# Patient Record
Sex: MALE | Race: WHITE | Employment: FULL TIME | URBAN - METROPOLITAN AREA
[De-identification: names, ages, dates, MRNs, and addresses within clinical notes are randomized per-mention and may not be internally consistent; named-entity substitution may affect disease eponyms.]

---

## 2017-08-25 ENCOUNTER — OFFICE VISIT (OUTPATIENT)
Dept: SURGERY | Age: 27
End: 2017-08-25

## 2017-08-25 VITALS
DIASTOLIC BLOOD PRESSURE: 76 MMHG | WEIGHT: 133.6 LBS | HEART RATE: 64 BPM | RESPIRATION RATE: 20 BRPM | TEMPERATURE: 98.4 F | SYSTOLIC BLOOD PRESSURE: 114 MMHG | BODY MASS INDEX: 21.47 KG/M2 | HEIGHT: 66 IN | OXYGEN SATURATION: 100 %

## 2017-08-25 DIAGNOSIS — L05.91 PILONIDAL CYST WITHOUT MENTION OF ABSCESS: Primary | ICD-10-CM

## 2017-08-25 DIAGNOSIS — L02.91 ABSCESS: ICD-10-CM

## 2017-08-25 NOTE — MR AVS SNAPSHOT
Visit Information Date & Time Provider Department Dept. Phone Encounter #  
 8/25/2017 10:10 AM Keri Rosario MD [facility] [place] [address] [id number] Your Appointments 10/9/2017  2:00 PM  
POST OP 10 MIN with Keri Rosario MD  
[facility] Dr. Eleazar Burciaga ([facility]) Appt Note: Post/op excision of Pilonidal cyst with advancement flap on 9/25/17  
 [address] [place] [address], [address] [facility], [address] [phone]  
[address] [facility], [address] [facility], [address] [facility] [address] [phone] Upcoming Health Maintenance Date Due DTaP/Tdap/Td series (1 - Tdap) 7/25/2012 INFLUENZA AGE 9 TO ADULT 8/1/2017 Allergies as of 8/25/2017  Review Complete On: 8/25/2017 By: Keri Rosario MD  
 No Known Allergies Current Immunizations  Never Reviewed No immunizations on file. Not reviewed this visit You Were Diagnosed With   
  
 Codes Comments Abscess    -  Primary ICD-10-CM: L02.91 
ICD-9-CM: 908. 9 Vitals BP Pulse Temp Resp Height(growth percentile) Weight(growth percentile) 114/76 (BP 1 Location: Left arm, BP Patient Position: Sitting) 64 98.4 °F (36.9 °C) (Oral) 20 5' 6\" (1.676 m) 133 lb 9.6 oz (60.6 kg) SpO2 BMI Smoking Status 100% 21.56 kg/m2 Never Smoker BMI and BSA Data Body Mass Index Body Surface Area  
 21.56 kg/m 2 1.68 m 2 Preferred Pharmacy Pharmacy Name Phone [facility] [address] [place] [facility] - [address] , [place] [address] [phone] Your Updated Medication List  
  
Notice  As of 8/25/2017 11:32 AM  
 You have not been prescribed any medications. We Performed the Following AEROBIC BACTERIAL CULTURE [id number] CPT(R)] Introducing [facility]! Ravin Layne introduces Leadjinit patient portal. Now you can access parts of your medical record, email your doctor's office, and request medication refills online. 1. In your internet browser, go to [email or web address] 2. Click on the First Time User? Click Here link in the Sign In box. You will see the New Member Sign Up page. 3. Enter your Car[employer] Nut Access Code exactly as it appears below. You will not need to use this code after youve completed the sign-up process. If you do not sign up before the expiration date, you must request a new code. · [employer] Access Code: [email or web address] Expires: 11/23/2017 10:35 AM 
 
4. Enter the last four digits of your Social Security Number (xxxx) and Date of Birth (mm/dd/yyyy) as indicated and click Submit. You will be taken to the next sign-up page. 5. Create a Ugeniet ID. This will be your Caro Nut login ID and cannot be changed, so think of one that is secure and easy to remember. 6. Create a Car[employer] Nut password. You can change your password at any time. 7. Enter your Password Reset Question and Answer. This can be used at a later time if you forget your password. 8. Enter your e-mail address. You will receive e-mail notification when new information is available in [address] 9. Click Sign Up. You can now view and download portions of your medical record. 10. Click the Download Summary menu link to download a portable copy of your medical information. If you have questions, please visit the Frequently Asked Questions section of the Car[employer] website. Remember, [employer] is NOT to be used for urgent needs. For medical emergencies, dial 911. Now available from your iPhone and Android! Please provide this summary of care documentation to your next provider. Your primary care clinician is listed as Henry Bucio. If you have any questions after today's visit, please call [phone].

## 2017-08-25 NOTE — PROGRESS NOTES
Saw one year ago  This is his first time back    Continue to get flairup every couple months  Drains and gets better  BMs normal, no pain with defication. No blood    Exam: several sinus tracts, several containing hair, along the midline and to the side does not appear to be extending to the rectum. Slight drainage, culture sent    Now ready to have it fixed because he is going off his parents insurance soon. F/u culture  Did not follow instructions to keep hair trimmed. Stressed the importance to do so now and after the surgery to prevent recurrence. I had an extensive discussion with Liat Potts regarding the risks, benefits, and alternatives of proceeding with an Excision of pilonidal cyst and closure with advancement flap. Risks of surgery including the risk of anesthesia, bleeding, infection, injury to surrounding structure, recurrence, poor healing and prolonged wound care, and the lack of symptomatic improvement were discussed and he is in agreement to proceed. Expressed understanding of the surgery and expected recovery. I had an extensive and thorough discussion with Liat Potts regarding current diagnosis and treatment recommendations. Total face-to-face time spent with him was 25 minutes with the majority spent with counseling and coordination of care.

## 2017-08-25 NOTE — LETTER
8/25/2017 11:27 AM 
 
Mr. Charisma Petty [facility] [address] Surgery Instruction Sheet You have been scheduled for surgery on 9/25/17 at 9:00am at [facility]. Please report to the Surgery Center at 8:00am, this is approximately 1 hours prior to your surgery time. The Surgery Center is located on the [address] [place] [address] side of the [facility], Building [address]. You may or may not need to have a Pre-op Visit prior to your surgery. The Surgery Center nurse will call you directly and set this appointment with you. Bring a list of medications and your insurance cards with you. You may eat/drink prior to this visit. The Pre-op nurse will review your medical history, medications and give you additional instructions. They will also confirm your arrival time Call your physician immediately if you notice a change in your health between the time you saw your physician and the day of surgery If you take a blood thinner, please let us know. Call your ordering Doctor to make sure you can stop taking it prior to your surgery. STOP YOUR  ASPIRIN 5 DAYS PRIOR TO SURGERY. DO NOT TAKE IBUPROFEN, ADVIL, MOTRIN, ALEVE, EXCEDRIN, BC POWDER, GOODIES, FISH OIL OR ANY MEDICATION CONTAINING ASPIRIN 5 DAYS PRIOR TO YOUR SURGERY. MAY TAKE TYLENOL Eat a light dinner the evening before your surgery. DO NOT EAT OR DRINK ANYTHING AFTER MIDNIGHT THE NIGHT BEFORE YOUR SURGERY. This includes water, chewing gum, lifesavers, etc.  The Pre op nurse will check with you about any medication that you may need to take the morning of surgery. Shower with a new bar of Dial, Safeguard (antibacterial) soap or solution given to you by Preop, the night before surgery. Do not use lotion, powder or deodorant on the skin after showering.   Wear loose, comfortable clothing the day of surgery and bring a container to store your contacts, eyeglasses, dentures, hearing aid, etc.  Do not bring money, valuables, jewelry, etc. to the hospital.   
 
If you are having outpatient surgery, someone must come with you the morning of surgery to drive you home. You can not drive for 24 hours after any anesthesia. Sometimes it is necessary to stay overnight and leave the next morning. This is still considered outpatient for insurance deductibles. Someone will still need to drive you home. If you have questions or concerns, please feel free to call /Stephen at [phone]. If you need to cancel your surgery, please call as soon as possible. Sincerely, Mayela Luke MD

## 2017-09-05 ENCOUNTER — TELEPHONE (OUTPATIENT)
Dept: SURGERY | Age: 27
End: 2017-09-05

## 2017-09-05 RX ORDER — SULFAMETHOXAZOLE AND TRIMETHOPRIM 800; 160 MG/1; MG/1
1 TABLET ORAL 2 TIMES DAILY
Qty: 20 TAB | Refills: 0 | Status: ON HOLD | OUTPATIENT
Start: 2017-09-20 | End: 2017-09-25

## 2017-09-05 NOTE — TELEPHONE ENCOUNTER
Aerobic culture abnormal from Pilonidal cyst drainage. Make sure to trim the hairs with miller's clippers Use gauze when it is draining  . Start Bactrim DS BID 5 days prior to surgery, called to Pharm[facility] for questions with clarification

## 2017-09-05 NOTE — TELEPHONE ENCOUNTER
Reviewed cultures:  Aerobic Culture (Abnormal)   Escherichia coli   Comment      Aerobic Culture (Abnormal)   Beta hemolytic Streptococcus, group B   Comment        Have him:    1.  make sure he is trimming the hairs with miller's clippers  2. Use gauze when it is draining  3. Start Bactrim DS BID 5 days prior to surgery.

## 2017-09-19 NOTE — PERIOP NOTES
[facility]  Ambulatory [place] Unit  Pre-operative Instructions    Surgery/Procedure Date  9/25/17            Tentative Arrival Time 0745      1. On the day of your surgery/procedure, please report to the Ambulatory Surgery Unit Registration Desk and sign in at your designated time. The Ambulatory Surgery Unit is located in [facility] on the [facility] side of the [facility] across from the [facility]. Please have all of your health insurance cards and a photo ID. 2. You must have someone with you to drive you home, as you should not drive a car for 24 hours following anesthesia. Please make arrangements for a responsible adult friend or family member to stay with you for at least the first 24 hours after your surgery. 3. Do not have anything to eat or drink (including water, gum, mints, coffee, juice) after midnight   9/24/17. This may not apply to medications prescribed by your physician. (Please note below the special instructions with medications to take the morning of surgery, if applicable.)    4. We recommend you do not drink any alcoholic beverages for 24 hours before and after your surgery. 5. Contact your surgeons office for instructions on the following medications: non-steroidal anti-inflammatory drugs (i.e. Advil, Aleve), vitamins, and supplements. (Some surgeons will want you to stop these medications prior to surgery and others may allow you to take them)   **If you are currently taking Plavix, Coumadin, Aspirin and/or other blood-thinning agents, contact your surgeon for instructions. ** Your surgeon will partner with the physician prescribing these medications to determine if it is safe to stop or if you need to continue taking. Please do not stop taking these medications without instructions from your surgeon.     6. In an effort to help prevent surgical site infection, we ask that you shower with an anti-bacterial soap (i.e. Dial or Safeguard) for 3 days prior to and on the morning of surgery, using a fresh towel after each shower. (Please begin this process with fresh bed linens.) Do not apply any lotions, powders, or deodorants after the shower on the day of your procedure. If applicable, please do not shave the operative site for 48 hours prior to surgery. 7. Wear comfortable clothes. Wear glasses instead of contacts. Do not bring any jewelry or money (other than copays or fees as instructed). Do not wear make-up, particularly mascara, the morning of your surgery. Do not wear nail polish, particularly if you are having foot /hand surgery. Wear your hair loose or down, no ponytails, buns, rosemary pins or clips. All body piercings must be removed. 8. You should understand that if you do not follow these instructions your surgery may be cancelled. If your physical condition changes (i.e. fever, cold or flu) please contact your surgeon as soon as possible. 9. It is important that you be on time. If a situation occurs where you may be late, or if you have any questions or problems, please call [phone].    10. Your surgery time may be subject to change. You will receive a phone call the day prior to surgery to confirm your arrival time. 11. Pediatric patients: please bring a change of clothes, diapers, bottle/sippy cup, pacifier, etc.      Special Instructions: Take all medications and inhalers, as prescribed, on the morning of surgery with a sip of water EXCEPT: none      I understand a pre-operative phone call will be made to verify my surgery time. In the event that I am not available, I give permission for a message to be left on my answering service and/or with another person?       Yes     (instructions given verbally during phone assessment- pt voiced understanding)     ___________________      ___________________      ________________  (Signature of Patient)          (Witness)                   (Date and Time)

## 2017-09-20 ENCOUNTER — TELEPHONE (OUTPATIENT)
Dept: SURGERY | Age: 27
End: 2017-09-20

## 2017-09-20 NOTE — TELEPHONE ENCOUNTER
Need to arrive @ [facility] @6:30am for 7:30am surgery start. Continue to follow all pr-op instructions. All questions answered with clarification.

## 2017-09-22 ENCOUNTER — ANESTHESIA EVENT (OUTPATIENT)
Dept: SURGERY | Age: 27
End: 2017-09-22
Payer: COMMERCIAL

## 2017-09-22 LAB
BACTERIA SPEC AEROBE CULT: ABNORMAL

## 2017-09-25 ENCOUNTER — ANESTHESIA (OUTPATIENT)
Dept: SURGERY | Age: 27
End: 2017-09-25
Payer: COMMERCIAL

## 2017-09-25 ENCOUNTER — HOSPITAL ENCOUNTER (OUTPATIENT)
Age: 27
Setting detail: OUTPATIENT SURGERY
Discharge: HOME | End: 2017-09-25
Attending: SURGERY | Admitting: SURGERY
Payer: COMMERCIAL

## 2017-09-25 VITALS
SYSTOLIC BLOOD PRESSURE: 119 MMHG | HEIGHT: 67 IN | BODY MASS INDEX: 20.4 KG/M2 | OXYGEN SATURATION: 100 % | HEART RATE: 60 BPM | WEIGHT: 130 LBS | DIASTOLIC BLOOD PRESSURE: 76 MMHG | TEMPERATURE: 97.4 F | RESPIRATION RATE: 12 BRPM

## 2017-09-25 PROCEDURE — 77030033138 HC SUT PGA STRATFX J&J -B: Performed by: SURGERY

## 2017-09-25 PROCEDURE — 76210000040 HC AMBSU PH I REC FIRST 0.5 HR: Performed by: SURGERY

## 2017-09-25 PROCEDURE — 77030011640 HC PAD GRND REM COVD -A: Performed by: SURGERY

## 2017-09-25 PROCEDURE — 77030008684 HC TU ET CUF COVD -B: Performed by: NURSE ANESTHETIST, CERTIFIED REGISTERED

## 2017-09-25 PROCEDURE — 77030011265 HC ELECTRD BLD HEX COVD -A: Performed by: SURGERY

## 2017-09-25 PROCEDURE — 76210000057 HC AMBSU PH II REC 1 TO 1.5 HR: Performed by: SURGERY

## 2017-09-25 PROCEDURE — 77030003028 HC SUT VCRL J&J -A: Performed by: SURGERY

## 2017-09-25 PROCEDURE — 74011250636 HC RX REV CODE- 250/636: Performed by: SURGERY

## 2017-09-25 PROCEDURE — 74011250636 HC RX REV CODE- 250/636: Performed by: ANESTHESIOLOGY

## 2017-09-25 PROCEDURE — 76060000062 HC AMB SURG ANES 1 TO 1.5 HR: Performed by: SURGERY

## 2017-09-25 PROCEDURE — 77030002933 HC SUT MCRYL J&J -A: Performed by: SURGERY

## 2017-09-25 PROCEDURE — 74011000250 HC RX REV CODE- 250

## 2017-09-25 PROCEDURE — 77030026438 HC STYL ET INTUB CARD -A: Performed by: NURSE ANESTHETIST, CERTIFIED REGISTERED

## 2017-09-25 PROCEDURE — 76030000001 HC AMB SURG OR TIME 1 TO 1.5: Performed by: SURGERY

## 2017-09-25 PROCEDURE — 77030018836 HC SOL IRR NACL ICUM -A: Performed by: SURGERY

## 2017-09-25 PROCEDURE — 74011250636 HC RX REV CODE- 250/636

## 2017-09-25 PROCEDURE — 74011000250 HC RX REV CODE- 250: Performed by: SURGERY

## 2017-09-25 PROCEDURE — 77030013567 HC DRN WND RESERV BARD -A: Performed by: SURGERY

## 2017-09-25 PROCEDURE — 77030020255 HC SOL INJ LR 1000ML BG: Performed by: SURGERY

## 2017-09-25 PROCEDURE — 88304 TISSUE EXAM BY PATHOLOGIST: CPT | Performed by: SURGERY

## 2017-09-25 PROCEDURE — 77030013079 HC BLNKT BAIR HGGR 3M -A: Performed by: NURSE ANESTHETIST, CERTIFIED REGISTERED

## 2017-09-25 PROCEDURE — 77030031139 HC SUT VCRL2 J&J -A: Performed by: SURGERY

## 2017-09-25 PROCEDURE — 77030012406 HC DRN WND PENRS BARD -A: Performed by: SURGERY

## 2017-09-25 RX ORDER — LIDOCAINE HYDROCHLORIDE 20 MG/ML
INJECTION, SOLUTION EPIDURAL; INFILTRATION; INTRACAUDAL; PERINEURAL AS NEEDED
Status: DISCONTINUED | OUTPATIENT
Start: 2017-09-25 | End: 2017-09-25 | Stop reason: HOSPADM

## 2017-09-25 RX ORDER — DIPHENHYDRAMINE HYDROCHLORIDE 50 MG/ML
12.5 INJECTION, SOLUTION INTRAMUSCULAR; INTRAVENOUS AS NEEDED
Status: DISCONTINUED | OUTPATIENT
Start: 2017-09-25 | End: 2017-09-25 | Stop reason: HOSPADM

## 2017-09-25 RX ORDER — SODIUM CHLORIDE 0.9 % (FLUSH) 0.9 %
5-10 SYRINGE (ML) INJECTION AS NEEDED
Status: DISCONTINUED | OUTPATIENT
Start: 2017-09-25 | End: 2017-09-25 | Stop reason: HOSPADM

## 2017-09-25 RX ORDER — BUPIVACAINE HYDROCHLORIDE AND EPINEPHRINE 5; 5 MG/ML; UG/ML
INJECTION, SOLUTION EPIDURAL; INTRACAUDAL; PERINEURAL AS NEEDED
Status: DISCONTINUED | OUTPATIENT
Start: 2017-09-25 | End: 2017-09-25 | Stop reason: HOSPADM

## 2017-09-25 RX ORDER — ROCURONIUM BROMIDE 10 MG/ML
INJECTION, SOLUTION INTRAVENOUS AS NEEDED
Status: DISCONTINUED | OUTPATIENT
Start: 2017-09-25 | End: 2017-09-25 | Stop reason: HOSPADM

## 2017-09-25 RX ORDER — DEXAMETHASONE SODIUM PHOSPHATE 4 MG/ML
INJECTION, SOLUTION INTRA-ARTICULAR; INTRALESIONAL; INTRAMUSCULAR; INTRAVENOUS; SOFT TISSUE AS NEEDED
Status: DISCONTINUED | OUTPATIENT
Start: 2017-09-25 | End: 2017-09-25 | Stop reason: HOSPADM

## 2017-09-25 RX ORDER — KETOROLAC TROMETHAMINE 30 MG/ML
INJECTION, SOLUTION INTRAMUSCULAR; INTRAVENOUS AS NEEDED
Status: DISCONTINUED | OUTPATIENT
Start: 2017-09-25 | End: 2017-09-25 | Stop reason: HOSPADM

## 2017-09-25 RX ORDER — SULFAMETHOXAZOLE AND TRIMETHOPRIM 800; 160 MG/1; MG/1
1 TABLET ORAL 2 TIMES DAILY
Qty: 20 TAB | Refills: 0 | Status: SHIPPED | OUTPATIENT
Start: 2017-09-25 | End: 2017-10-05

## 2017-09-25 RX ORDER — GLYCOPYRROLATE 0.2 MG/ML
INJECTION INTRAMUSCULAR; INTRAVENOUS AS NEEDED
Status: DISCONTINUED | OUTPATIENT
Start: 2017-09-25 | End: 2017-09-25 | Stop reason: HOSPADM

## 2017-09-25 RX ORDER — IBUPROFEN 600 MG/1
600 TABLET ORAL
Qty: 40 TAB | Refills: 0 | Status: SHIPPED | OUTPATIENT
Start: 2017-09-25 | End: 2017-10-09

## 2017-09-25 RX ORDER — MORPHINE SULFATE 10 MG/ML
2 INJECTION, SOLUTION INTRAMUSCULAR; INTRAVENOUS
Status: DISCONTINUED | OUTPATIENT
Start: 2017-09-25 | End: 2017-09-25 | Stop reason: HOSPADM

## 2017-09-25 RX ORDER — FENTANYL CITRATE 50 UG/ML
25 INJECTION, SOLUTION INTRAMUSCULAR; INTRAVENOUS
Status: DISCONTINUED | OUTPATIENT
Start: 2017-09-25 | End: 2017-09-25 | Stop reason: HOSPADM

## 2017-09-25 RX ORDER — CEFAZOLIN SODIUM IN 0.9 % NACL 2 G/100 ML
2 PLASTIC BAG, INJECTION (ML) INTRAVENOUS ONCE
Status: DISCONTINUED | OUTPATIENT
Start: 2017-09-25 | End: 2017-09-25 | Stop reason: HOSPADM

## 2017-09-25 RX ORDER — LIDOCAINE HYDROCHLORIDE 10 MG/ML
0.1 INJECTION, SOLUTION EPIDURAL; INFILTRATION; INTRACAUDAL; PERINEURAL AS NEEDED
Status: DISCONTINUED | OUTPATIENT
Start: 2017-09-25 | End: 2017-09-25 | Stop reason: HOSPADM

## 2017-09-25 RX ORDER — FENTANYL CITRATE 50 UG/ML
INJECTION, SOLUTION INTRAMUSCULAR; INTRAVENOUS AS NEEDED
Status: DISCONTINUED | OUTPATIENT
Start: 2017-09-25 | End: 2017-09-25 | Stop reason: HOSPADM

## 2017-09-25 RX ORDER — OXYCODONE AND ACETAMINOPHEN 5; 325 MG/1; MG/1
1 TABLET ORAL
Qty: 40 TAB | Refills: 0 | Status: SHIPPED | OUTPATIENT
Start: 2017-09-25 | End: 2017-10-09

## 2017-09-25 RX ORDER — HYDROMORPHONE HYDROCHLORIDE 1 MG/ML
.2-.5 INJECTION, SOLUTION INTRAMUSCULAR; INTRAVENOUS; SUBCUTANEOUS ONCE
Status: DISCONTINUED | OUTPATIENT
Start: 2017-09-25 | End: 2017-09-25 | Stop reason: HOSPADM

## 2017-09-25 RX ORDER — SODIUM CHLORIDE, SODIUM LACTATE, POTASSIUM CHLORIDE, CALCIUM CHLORIDE 600; 310; 30; 20 MG/100ML; MG/100ML; MG/100ML; MG/100ML
25 INJECTION, SOLUTION INTRAVENOUS CONTINUOUS
Status: DISCONTINUED | OUTPATIENT
Start: 2017-09-25 | End: 2017-09-25 | Stop reason: HOSPADM

## 2017-09-25 RX ORDER — NEOSTIGMINE METHYLSULFATE 1 MG/ML
INJECTION INTRAVENOUS AS NEEDED
Status: DISCONTINUED | OUTPATIENT
Start: 2017-09-25 | End: 2017-09-25 | Stop reason: HOSPADM

## 2017-09-25 RX ORDER — ONDANSETRON 2 MG/ML
INJECTION INTRAMUSCULAR; INTRAVENOUS AS NEEDED
Status: DISCONTINUED | OUTPATIENT
Start: 2017-09-25 | End: 2017-09-25 | Stop reason: HOSPADM

## 2017-09-25 RX ORDER — CEFAZOLIN SODIUM IN 0.9 % NACL 2 G/100 ML
PLASTIC BAG, INJECTION (ML) INTRAVENOUS
Status: DISCONTINUED
Start: 2017-09-25 | End: 2017-09-25 | Stop reason: HOSPADM

## 2017-09-25 RX ORDER — OXYCODONE AND ACETAMINOPHEN 5; 325 MG/1; MG/1
1 TABLET ORAL ONCE
Status: DISCONTINUED | OUTPATIENT
Start: 2017-09-25 | End: 2017-09-25 | Stop reason: HOSPADM

## 2017-09-25 RX ORDER — MIDAZOLAM HYDROCHLORIDE 1 MG/ML
INJECTION, SOLUTION INTRAMUSCULAR; INTRAVENOUS AS NEEDED
Status: DISCONTINUED | OUTPATIENT
Start: 2017-09-25 | End: 2017-09-25 | Stop reason: HOSPADM

## 2017-09-25 RX ORDER — SODIUM CHLORIDE 0.9 % (FLUSH) 0.9 %
5-10 SYRINGE (ML) INJECTION EVERY 8 HOURS
Status: DISCONTINUED | OUTPATIENT
Start: 2017-09-25 | End: 2017-09-25 | Stop reason: HOSPADM

## 2017-09-25 RX ORDER — PROPOFOL 10 MG/ML
INJECTION, EMULSION INTRAVENOUS AS NEEDED
Status: DISCONTINUED | OUTPATIENT
Start: 2017-09-25 | End: 2017-09-25 | Stop reason: HOSPADM

## 2017-09-25 RX ADMIN — FENTANYL CITRATE 25 MCG: 50 INJECTION, SOLUTION INTRAMUSCULAR; INTRAVENOUS at 07:45

## 2017-09-25 RX ADMIN — KETOROLAC TROMETHAMINE 30 MG: 30 INJECTION, SOLUTION INTRAMUSCULAR; INTRAVENOUS at 08:56

## 2017-09-25 RX ADMIN — ONDANSETRON 4 MG: 2 INJECTION INTRAMUSCULAR; INTRAVENOUS at 07:53

## 2017-09-25 RX ADMIN — FENTANYL CITRATE 50 MCG: 50 INJECTION, SOLUTION INTRAMUSCULAR; INTRAVENOUS at 07:39

## 2017-09-25 RX ADMIN — DEXAMETHASONE SODIUM PHOSPHATE 8 MG: 4 INJECTION, SOLUTION INTRA-ARTICULAR; INTRALESIONAL; INTRAMUSCULAR; INTRAVENOUS; SOFT TISSUE at 07:53

## 2017-09-25 RX ADMIN — NEOSTIGMINE METHYLSULFATE 2 MG: 1 INJECTION INTRAVENOUS at 08:43

## 2017-09-25 RX ADMIN — GLYCOPYRROLATE 0.4 MG: 0.2 INJECTION INTRAMUSCULAR; INTRAVENOUS at 08:43

## 2017-09-25 RX ADMIN — ROCURONIUM BROMIDE 5 MG: 10 INJECTION, SOLUTION INTRAVENOUS at 07:39

## 2017-09-25 RX ADMIN — SODIUM CHLORIDE, SODIUM LACTATE, POTASSIUM CHLORIDE, AND CALCIUM CHLORIDE: 600; 310; 30; 20 INJECTION, SOLUTION INTRAVENOUS at 08:42

## 2017-09-25 RX ADMIN — ROCURONIUM BROMIDE 25 MG: 10 INJECTION, SOLUTION INTRAVENOUS at 07:40

## 2017-09-25 RX ADMIN — PROPOFOL 130 MG: 10 INJECTION, EMULSION INTRAVENOUS at 07:40

## 2017-09-25 RX ADMIN — LIDOCAINE HYDROCHLORIDE 60 MG: 20 INJECTION, SOLUTION EPIDURAL; INFILTRATION; INTRACAUDAL; PERINEURAL at 07:39

## 2017-09-25 RX ADMIN — FENTANYL CITRATE 25 MCG: 50 INJECTION, SOLUTION INTRAMUSCULAR; INTRAVENOUS at 07:53

## 2017-09-25 RX ADMIN — SODIUM CHLORIDE, SODIUM LACTATE, POTASSIUM CHLORIDE, AND CALCIUM CHLORIDE 25 ML/HR: 600; 310; 30; 20 INJECTION, SOLUTION INTRAVENOUS at 06:56

## 2017-09-25 RX ADMIN — CEFAZOLIN 2 G: 10 INJECTION, POWDER, FOR SOLUTION INTRAVENOUS; PARENTERAL at 07:50

## 2017-09-25 RX ADMIN — MIDAZOLAM HYDROCHLORIDE 2 MG: 1 INJECTION, SOLUTION INTRAMUSCULAR; INTRAVENOUS at 07:33

## 2017-09-25 NOTE — H&P
Pre-Op History and Physical    Subjective:     Nicholas Ralph is a 33 y.o. male who is here for Procedure(s):  EXCISION PILONIDAL CYST/CLOSURE WITH ADVANCEMENT FLAP    Past Medical History:   Diagnosis Date    Asthma       History reviewed. No pertinent surgical history. History reviewed. No pertinent family history. Social History   Substance Use Topics    Smoking status: Never Smoker    Smokeless tobacco: Never Used    Alcohol use No       Prior to Admission medications    Medication Sig Start Date End Date Taking? Authorizing Provider   trimethoprim-sulfamethoxazole (BACTRIM DS, SEPTRA DS) 160-800 mg per tablet Take 1 Tab by mouth two (2) times a day for 10 days. 9/20/17 9/30/17 Yes Leonidas Porter MD     No Known Allergies     Review of Systems:  A comprehensive review of systems was negative except for that written in the History of Present Illness. Objective: Intake and Output:            Physical Exam:   Lungs: clear to auscultation bilaterally  Heart: regular rate and rhythm  Abdomen: soft, non-tender. No masses,  no organomegaly        Data Review:   No results found for this or any previous visit (from the past 24 hour(s)). Assessment:     Active Problems:    * No active hospital problems. *      Plan:     I had an extensive discussion with Nicholas Ralph regarding the risks, benefits, and alternatives of proceeding with a  Procedure(s):  EXCISION OF PILONIDAL CYST WITH CLOSURE WITH ADVANCEMENT FLAP. Risks, benefits, and alternatives of surgery including the risk of anesthesia, bleeding, infection, recurrence, chronic pain, and the lack of symptomatic improvement were discussed and Nicholas Ralph and vernon mother are in agreement to proceed.         Signed By: Leonidas Porter MD     September 25, 2017

## 2017-09-25 NOTE — OP NOTES
OPERATIVE REPORT      PREOPERATIVE DIAGNOSIS: Pilonidal cyst with recurrent and chronic pilonidal abscess. POSTOPERATIVE DIAGNOSIS: Pilonidal cyst with recurrent and chronic pilonidal abscess. OPERATIVE PROCEDURE: Excision of complex pilonidal cyst with closure with right to left advancement flap. ANESTHESIA: General plus local.    ESTIMATED BLOOD LOSS: Minimal.    SPECIMENS REMOVED: Pilonidal cyst.    DRAINS: One Brown-Luna drain left in the wound bed, brought through a  separate stab incision to the  midline superior to the incision and  secured to the skin with 3-0 nylon. COMPLICATIONS: None. INDICATION: Tyler Ross is a 33 y.o. male with history of recurrent pilonidal infection. Despite pleading with him multiple times in the office he has not trimmed his hairs. Complex sinus tracts are present on exam that extend several CM to the left of midline containing hair. He Is brought to the operating room today for excision. Risks and alternativeswere discussed. Consent form was placed in the chart. DESCRIPTION OF PROCEDURE: After satisfactory induction of general  anesthesia the patient was placed in the prone position on chest rolls and  appropriate padding. The patient's lower back and postsacral area were  prepped and draped sterilely. After infusing a local anesthetic an  off-center elliptical incision was made to allow for complete excision of  the sinus tracts. The incision was carried down through the underlying soft  tissue using cautery, down to the sacral fascia. The specimen was excised  in its entirety. Only healthy tissue remained. Hemostasis was ensured. Extensive undermining was performed on the right side at the level external   to the gluteal fascia to allow for creation of an advancement flap to close the wound. Once completed, complete hemostasis was noted. A YURY drain was brought out through a separate stab incision and secured as above.     The wound was approximated with a series of interrupted 2-0 Vicryl sutures. Dog ear was corrected superiorly, followed by  3-0 Vicryl sutures and following closure, the skin with Monocryl and Dermabond. The patient was awakened, returned to the supine position and remained stable during my presence in the operating room.

## 2017-09-25 NOTE — PERIOP NOTES
Charisma Seneca  1990  [id number]    Situation:  Verbal report given from: Heron Chauhan RN., Tomás Mancilla CRNA  Procedure: Procedure(s):  EXCISION PILONIDAL CYST/CLOSURE WITH ADVANCEMENT FLAP    Background:    Preoperative diagnosis: PILONIDAL CYST    Postoperative diagnosis: PILONIDAL CYST    :  Dr. Domonique Medrano    Assistant(s): Circ-1: Abigail Sim RN  Circ-Relief: Primitivo Adame RN  Scrub Tech-1: Katha Meigs. Caesar Koyanagi  Surg Asst-1: Taylor Wright    Specimens:   ID Type Source Tests Collected by Time Destination   1 : pilonidal cyst Preservative Buttock  Elizabeth Clemons MD 9/25/2017 0805 Pathology       Assessment:  Intra-procedure medications         Anesthesia gave intra-procedure sedation and medications, see anesthesia flow sheet     Intravenous fluids: LR@ KVO     Vital signs stable .  Pt denies chill or pain      Recommendation:    Permission to share finding with family or friend yes

## 2017-09-25 NOTE — PERIOP NOTES
0900 Patient drowsy but arousable with verbal  0914 mom allowed back to sit with patient  0915 pt tolerated liquids becoming more awake denies chill and slight pain in lower lumbar region from surgery  0950 Education complete  Pt. Alert. Denies pain or chill. Discharge instructions reviewed with caregiver and patient. Allowed and answered questions. Tolerating PO fluids. Both state ready for discharge.

## 2017-09-25 NOTE — PERIOP NOTES
Reviewed discharge instructions with mother. Mother has cared for YURY before. Had drainage measuring sheet and YURY instruction sheet. Showed care of YURY and dressing. Mother given work excuse for her and son. Since everything is contained Dr. Liset Osman instructed how he could put YURY bulb in pocket and assure drain tube covered under clothing then he could return to work in one week.

## 2017-09-25 NOTE — ANESTHESIA POSTPROCEDURE EVALUATION
Post-Anesthesia Evaluation and Assessment    Patient: Kunal Cortez MRN: [id number]  SSN: [id number]    YOB: 1990  Age: 33 y.o. Sex: male       Cardiovascular Function/Vital Signs  Visit Vitals    /76 (BP Patient Position: Head of bed elevated (Comment degrees))    Pulse 60    Temp 36.3 °C (97.4 °F)    Resp 12    Ht 5' 7\" (1.702 m)    Wt 59 kg (130 lb)    SpO2 100%    BMI 20.36 kg/m2       Patient is status post general anesthesia for Procedure(s):  EXCISION PILONIDAL CYST/CLOSURE WITH ADVANCEMENT FLAP. Nausea/Vomiting: None    Postoperative hydration reviewed and adequate. Pain:  Pain Scale 1: Numeric (0 - 10) (09/25/17 1000)  Pain Intensity 1: 2 (09/25/17 1000)   Managed    Neurological Status:   Neuro (WDL): Within Defined Limits (09/25/17 0944)  Neuro  Neurologic State: Alert;Eyes open spontaneously (09/25/17 0944)  LUE Motor Response: Purposeful (09/25/17 0944)  LLE Motor Response: Purposeful (09/25/17 0944)  RUE Motor Response: Purposeful (09/25/17 0944)  RLE Motor Response: Purposeful (09/25/17 0944)   At baseline    Mental Status and Level of Consciousness: Arousable    Pulmonary Status:   O2 Device: Room air (09/25/17 1000)   Adequate oxygenation and airway patent    Complications related to anesthesia: None    Post-anesthesia assessment completed.  No concerns    Signed By: Mirta Robison MD     September 25, 2017

## 2017-09-25 NOTE — DISCHARGE INSTRUCTIONS
Dr. Emily Long Discharge Instructions for:  Leonard Coronado    MRN: [id number] : 1990    Admitted: 2017  Discharged: 2017       What to do at Home  >>>You received Toradol during your surgery @ 0900. You may not take any form of NSAIDS (non steroidal anti inflammatory drugs) such as Advil, Ibuprofen, Aleve, Motrin until 1500 hrs (3 p.m.)   Recommended diet: Resume your normal diet    Recommended activity: activity as tolerated, see below for details    Wound Care:  · Replace outer gauze with new dry gauze daily starting today to protect the \"glue\". · See additional instructions below:    YURY drain instructions:  · Empty YURY drain daily and record output. · Bring record of output to next office visit. · Avoid tension on the drain to keep it from pulling at skin. · Keep drain site clean and covered with dry gauze. Follow-up with Dr. Liset Omsan in 2 weeks. Call [phone] for an appointment. Pilonidal Cyst Excision: What to Expect at Home  Your Recovery  The amount of time it will take for you to heal depends on the way your surgery was done. If the cut (incision) was closed with stitches, it will probably take about 4 weeks to completely heal.  After the incision has healed, you will have a scar where the cyst was removed. This will fade and become softer with time. Most people can go back to work and most activities after 2 to 4 weeks. Until you have completely healed, you will need to avoid strenuous exercise and activities that require long periods of sitting. This care sheet gives you a general idea about how long it will take for you to recover. But each person recovers at a different pace. Follow the steps below to get better as quickly as possible. How can you care for yourself at home? Activity  · Rest when you feel tired. Getting enough sleep will help you recover. · Try to walk each day. Start by walking a little more than you did the day before.  Bit by bit, increase the amount you walk. Walking boosts blood flow and helps prevent pneumonia and constipation. · Shower as usual. Hildred Lather the area around your incision dry with a towel when you are done. Avoid baths until the wound is completely healed. Keep the area dry and clean. · You may drive when you are no longer taking pain medicine and can quickly move your foot from the gas pedal to the brake. You must also be able to sit comfortably for a long period of time, even if you do not plan to go far. You might get caught in traffic. · Avoid sitting for a long time or sitting on hard surfaces until your incision has healed. · Most people are able to return to work within 2 to 4 weeks after surgery. Diet  · You can eat your normal diet. If your stomach is upset, try bland, low-fat foods like plain rice, broiled chicken, toast, and yogurt. · Drink plenty of fluids (unless your doctor tells you not to). · You may notice that your bowel movements are not regular right after your surgery. This is common. Try to avoid constipation and straining with bowel movements. You may want to take a fiber supplement every day. If you have not had a bowel movement after a couple of days, ask your doctor about taking a mild laxative. Medicines  · Take pain medicines exactly as directed. ¨ If the doctor gave you a prescription medicine for pain, take it as prescribed. ¨ If you are not taking a prescription pain medicine, take an over-the-counter medicine such as acetaminophen (Tylenol), ibuprofen (Advil, Motrin), or naproxen (Aleve). Read and follow all instructions on the label. ¨ Do not take two or more pain medicines at the same time unless the doctor told you to. Many pain medicines have acetaminophen, which is Tylenol. Too much acetaminophen (Tylenol) can be harmful. · If your doctor prescribed antibiotics, take them as directed. Do not stop taking them just because you feel better. You need to take the full course of antibiotics.   · If you think your pain medicine is making you sick to your stomach:  ¨ Take your medicine after meals (unless your doctor has told you not to). ¨ Ask your doctor for a different pain medicine. Incision care    How to Care for Your Wound After Its Treated With DERMABOND* Topical Skin Adhesive    DERMABOND* Topical Skin Adhesive (2-octyl cyanoacrylate) is a sterile, liquid skin adhesivethat holds wound edges together. The film will usually remain in place for 5 to 10 days, thennaturally fall off your skin. The following will answer some of your questions and provide instructions for proper care for yourwound while it is healing:    CHECK WOUND APPEARANCE   Some swelling, redness, and pain are common with all wounds and normally will go away as the wound heals. If swelling, redness, or pain increases or if the wound feels warm to the touch, contact a doctor. Also contact a doctor if the wound edges reopen or separate. REPLACE BANDAGES   If your wound is bandaged, keep the bandage dry.  Replace the dressing daily until the adhesive film has fallen off or if the bandage should become wet, unless otherwise instructed by your physician.  When changing the dressing, do not place tape directly over the DERMABOND adhesive film, because removing the tape later may also remove the film. AVOID TOPICAL MEDICATIONS   Do not apply liquid or ointment medications or any other product to your wound while the DERMABOND adhesive film is in place. These may loosen the film before your wound is healed. KEEP WOUND DRY AND PROTECTED   You may occasionally and briefly wet your wound in the shower or bath. Do not soak or scrub your wound, do not swim, and avoid periods of heavy perspiration until the DERMABOND adhesive has naturally fallen off. After showering or bathing, gently blot your wound dry with a soft towel.  If a protective dressing is being used, apply a fresh, dry bandage, being sure to keep the tape off the DERMABOND adhesive film.  Apply a clean, dry bandage over the wound if necessary to protect it.  Protect your wound from injury until the skin has had sufficient time to heal.   Do not scratch, rub, or pick at the DERMABOND adhesive film. This may loosen the film before your wound is healed.  Protect the wound from prolonged exposure to sunlight or tanning lamps while the film is in place. Other instructions  · Use a doughnut cushion if sitting is uncomfortable. · Keep the area around your wound free from hair. Ask your doctor what method of hair removal will work best.  Follow-up care is a key part of your treatment and safety. Be sure to make and go to all appointments, and call your doctor if you are having problems. Itâs also a good idea to know your test results and keep a list of the medicines you take. When should you call for help? Call 911 anytime you think you may need emergency care. For example, call if:  · You passed out (lost consciousness). · You have sudden chest pain and shortness of breath, or you cough up blood. · You have severe belly pain. Call your doctor now or seek immediate medical care if:  · You have pain that does not get better after you take your pain medicine. · Your incision was closed with stitches and the stitches come loose, or your incision comes open. · Bright red blood has soaked through the bandage over your incision. · You have signs of infection, such as:  ¨ Increased pain, swelling, warmth, or redness. ¨ Red streaks leading from the incision. ¨ Pus draining from the incision. ¨ Swollen lymph nodes in your neck, armpits, or groin. A fever. DO NOT TAKE TYLENOL/ACETAMINOPHEN WITH PERCOCET, [facility], [address] TAKE NARCOTIC PAIN MEDICATIONS WITH FOOD   Narcotics tend to be constipating, we suggest taking a stool softener such as Colace or Miralax (follow package instructions). DO NOT DRIVE WHILE TAKING NARCOTIC PAIN MEDICATIONS.     DO NOT TAKE SLEEPING MEDICATIONS OR ANTIANXIETY MEDICATIONS WHILE TAKING NARCOTIC PAIN MEDICATIONS,  ESPECIALLY THE NIGHT OF ANESTHESIA. CPAP PATIENTS BE SURE TO WEAR MACHINE WHENEVER NAPPING OR SLEEPING. DISCHARGE SUMMARY from Nurse    The following personal items collected during your admission are returned to you:   Dental Appliance: Dental Appliances: None  Vision: Visual Aid: None  Hearing Aid:    Jewelry: Jewelry: None  Clothing: Clothing: Other (comment) (belonging bag)  Other Valuables: Other Valuables: None  Valuables sent to safe:        PATIENT INSTRUCTIONS:    After General Anesthesia or Intravenous Sedation, for 24 hours or while taking prescription Narcotics:        Someone should be with you for the next 24 hours. For your own safety, a responsible adult must drive you home. · Limit your activities  · Recommended activity: Rest today, up with assistance today. Do not climb stairs or shower unattended for the next 24 hours. · Please start with a soft bland diet and advance as tolerated (no nausea) to regular diet. · If you have a sore throat you should try the following: fluids, warm salt water gargles, or throat lozenges. If it does not improve after several days please follow up with your primary physician. · Do not drive and operate hazardous machinery  · Do not make important personal or business decisions  · Do  not drink alcoholic beverages  · If you have not urinated within 8 hours after discharge, please contact your surgeon on call.     Report the following to your surgeon:  · Excessive pain, swelling, redness or odor of or around the surgical area  · Temperature over 100.5  · Nausea and vomiting lasting longer than 4 hours or if unable to take medications  · Any signs of decreased circulation or nerve impairment to extremity: change in color, persistent  numbness, tingling, coldness or increase pain      · You will receive a Post Operative Call from one of the Recovery Room Nurses on the day after your surgery to check on you. It is very important for us to know how you are recovering after your surgery. If you have an issue or need to speak with someone, please call your surgeon, do not wait for the post operative call. · You may receive an e-mail or letter in the mail from [employer] regarding your experience with us in the Ambulatory Surgery Unit. Your feedback is valuable to us and we appreciate your participation in the survey. · If the above instructions are not adequate, please contact Karma Rocha RN, [facility] anesthesia Nurse Manager or our Anesthesiologist, at [phone]. If you are having problems after your surgery, call the physician at his office number. · We wish you a speedy recovery ? What to do at Home:      *  Please give a list of your current medications to your Primary Care Provider. *  Please update this list whenever your medications are discontinued, doses are      changed, or new medications (including over-the-counter products) are added. *  Please carry medication information at all times in case of emergency situations. These are general instructions for a healthy lifestyle:    No smoking/ No tobacco products/ Avoid exposure to second hand smoke    Surgeon General's Warning:  Quitting smoking now greatly reduces serious risk to your health. Obesity, smoking, and sedentary lifestyle greatly increases your risk for illness    A healthy diet, regular physical exercise & weight monitoring are important for maintaining a healthy lifestyle    You may be retaining fluid if you have a history of heart failure or if you experience any of the following symptoms:  Weight gain of 3 pounds or more overnight or 5 pounds in a week, increased swelling in our hands or feet or shortness of breath while lying flat in bed.   Please call your doctor as soon as you notice any of these symptoms; do not wait until your next office visit. Recognize signs and symptoms of STROKE:    B - Balance  E - Eyes    F-  Face looks uneven    A-  Arms unable to move or move even    S-  Speech slurred or non-existent    T-  Time-call 911 as soon as signs and symptoms begin-DO NOT go       Back to bed or wait to see if you get better-TIME IS BRAIN. If you have not received your influenza and/or pneumococcal vaccine, please follow up with your primary care physician. The discharge information has been reviewed with the patient and caregiver. The patient and caregiver verbalized understanding.

## 2017-09-25 NOTE — ANESTHESIA PREPROCEDURE EVALUATION
Anesthetic History   No history of anesthetic complications            Review of Systems / Medical History  Patient summary reviewed, nursing notes reviewed and pertinent labs reviewed    Pulmonary            Asthma (exercise-induced)        Neuro/Psych   Within defined limits           Cardiovascular  Within defined limits                Exercise tolerance: >4 METS     GI/Hepatic/Renal  Within defined limits              Endo/Other  Within defined limits           Other Findings              Physical Exam    Airway  Mallampati: I  TM Distance: 4 - 6 cm  Neck ROM: normal range of motion   Mouth opening: Normal     Cardiovascular    Rhythm: regular  Rate: normal      Pertinent negatives: No murmur   Dental  No notable dental hx       Pulmonary  Breath sounds clear to auscultation               Abdominal  GI exam deferred       Other Findings            Anesthetic Plan    ASA: 2  Anesthesia type: general          Induction: Intravenous  Anesthetic plan and risks discussed with: Patient

## 2017-09-25 NOTE — IP AVS SNAPSHOT
[address] [facility] 
[phone] Patient: Natalya Vaughan MRN: [id number] SUBHA:[id number] You are allergic to the following No active allergies Recent Documentation Height Weight BMI Smoking Status 1.702 m 59 kg 20.36 kg/m2 Never Smoker Emergency Contacts Name Discharge Info Relation Home Work Mobile Patsy Rivera  Mother [14]   [phone] About your hospitalization You were admitted on:  September 25, 2017 You last received care in the:  [facility] PACU You were discharged on:  September 25, 2017 Unit phone number:  [phone] Why you were hospitalized Your primary diagnosis was:  Not on File Providers Seen During Your Hospitalizations Provider Role Specialty Primary office phone Harleen Haywood MD Attending Provider General Surgery [phone] Your Primary Care Physician (PCP) Primary Care Physician Office Phone Office Fax Jennifer Sabana [place] [phone] Follow-up Information Follow up With Details Comments Contact Info [place], [address] [facility] 
[phone] Your Appointments Monday October 09, 2017  2:00 PM EDT  
POST OP 10 MIN with Harleen Haywood MD  
[facility] Dr. Eleazar Mccabe [facility] ([facility]) [address] [place] [address], [address] [place] [address]  
[phone] Current Discharge Medication List  
  
START taking these medications Dose & Instructions Dispensing Information Comments Morning Noon Evening Bedtime  
 ibuprofen 600 mg tablet Commonly known as:  MOTRIN Your last dose was: Your next dose is:    
   
   
 Dose:  600 mg Take 1 Tab by mouth every six (6) hours as needed for Pain. Quantity:  40 Tab Refills:  0 oxyCODONE-acetaminophen 5-325 mg per tablet Commonly known as:  PERCOCET Your last dose was: Your next dose is:    
   
   
 Dose:  1 Tab Take 1 Tab by mouth every four (4) hours as needed for Pain. Max Daily Amount: 6 Tabs. Quantity:  40 Tab Refills:  0 CONTINUE these medications which have NOT CHANGED Dose & Instructions Dispensing Information Comments Morning Noon Evening Bedtime  
 trimethoprim-sulfamethoxazole 160-800 mg per tablet Commonly known as:  BACTRIM DS, SEPTRA DS Your last dose was: Your next dose is:    
   
   
 Dose:  1 Tab Take 1 Tab by mouth two (2) times a day for 10 days. Quantity:  20 Tab Refills:  0 Where to Get Your Medications These medications were sent to [address], [address], [address] [place] [address] Phone:  [phone]  
  ibuprofen 600 mg tablet  
 trimethoprim-sulfamethoxazole 160-800 mg per tablet Information on where to get these meds will be given to you by the nurse or doctor. ! Ask your nurse or doctor about these medications  
  oxyCODONE-acetaminophen 5-325 mg per tablet Discharge Instructions Dr. Jonh Real Discharge Instructions for:  Kash Lay MRN: [id number] : 1990 Admitted: 2017  Discharged: 2017 What to do at [facility] Recommended diet: Resume your normal diet Recommended activity: activity as tolerated, see below for details Wound Care: · Replace outer gauze with new dry gauze daily starting today to protect the \"glue\". · See additional instructions below: YURY drain instructions: 
· Empty YURY drain daily and record output. · Bring record of output to next office visit. · Avoid tension on the drain to keep it from pulling at skin. · Keep drain site clean and covered with dry gauze. Follow-up with Dr. Debora Ness in 2 weeks. Call [phone] for an appointment. Pilonidal Cyst Excision: What to Expect at Home Your Recovery The amount of time it will take for you to heal depends on the way your surgery was done. If the cut (incision) was closed with stitches, it will probably take about 4 weeks to completely heal.  After the incision has healed, you will have a scar where the cyst was removed. This will fade and become softer with time. Most people can go back to work and most activities after 2 to 4 weeks. Until you have completely healed, you will need to avoid strenuous exercise and activities that require long periods of sitting. This care sheet gives you a general idea about how long it will take for you to recover. But each person recovers at a different pace. Follow the steps below to get better as quickly as possible. How can you care for yourself at home? Activity · Rest when you feel tired. Getting enough sleep will help you recover. · Try to walk each day. Start by walking a little more than you did the day before. Bit by bit, increase the amount you walk. Walking boosts blood flow and helps prevent pneumonia and constipation. · Shower as usual. Vear Elias the area around your incision dry with a towel when you are done. Avoid baths until the wound is completely healed. Keep the area dry and clean. · You may drive when you are no longer taking pain medicine and can quickly move your foot from the gas pedal to the brake. You must also be able to sit comfortably for a long period of time, even if you do not plan to go far. You might get caught in traffic. · Avoid sitting for a long time or sitting on hard surfaces until your incision has healed. · Most people are able to return to work within 2 to 4 weeks after surgery. Diet · You can eat your normal diet. If your stomach is upset, try bland, low-fat foods like plain rice, broiled chicken, toast, and yogurt. · Drink plenty of fluids (unless your doctor tells you not to). · You may notice that your bowel movements are not regular right after your surgery. This is common. Try to avoid constipation and straining with bowel movements. You may want to take a fiber supplement every day. If you have not had a bowel movement after a couple of days, ask your doctor about taking a mild laxative. Medicines · Take pain medicines exactly as directed. ¨ If the doctor gave you a prescription medicine for pain, take it as prescribed. ¨ If you are not taking a prescription pain medicine, take an over-the-counter medicine such as acetaminophen (Tylenol), ibuprofen (Advil, Motrin), or naproxen (Aleve). Read and follow all instructions on the label. ¨ Do not take two or more pain medicines at the same time unless the doctor told you to. Many pain medicines have acetaminophen, which is Tylenol. Too much acetaminophen (Tylenol) can be harmful. · If your doctor prescribed antibiotics, take them as directed. Do not stop taking them just because you feel better. You need to take the full course of antibiotics. · If you think your pain medicine is making you sick to your stomach: 
¨ Take your medicine after meals (unless your doctor has told you not to). ¨ Ask your doctor for a different pain medicine. Incision care How to Care for Your Wound After Its Treated With DERMABOND* Topical Skin Adhesive DERMABOND* Topical Skin Adhesive (2-octyl cyanoacrylate) is a sterile, liquid skin adhesivethat holds wound edges together. The film will usually remain in place for 5 to 10 days, thennaturally fall off your skin. The following will answer some of your questions and provide instructions for proper care for yourwound while it is healing: CHECK WOUND APPEARANCE 
 Some swelling, redness, and pain are common with all wounds and normally will go away as the wound heals.  If swelling, redness, or pain increases or if the wound feels warm to the touch, contact a doctor. Also contact a doctor if the wound edges reopen or separate. REPLACE BANDAGES 
 If your wound is bandaged, keep the bandage dry.  Replace the dressing daily until the adhesive film has fallen off or if the bandage should become wet, unless otherwise instructed by your physician.  When changing the dressing, do not place tape directly over the DERMABOND adhesive film, because removing the tape later may also remove the film. AVOID TOPICAL MEDICATIONS  Do not apply liquid or ointment medications or any other product to your wound while the DERMABOND adhesive film is in place. These may loosen the film before your wound is healed. KEEP WOUND DRY AND PROTECTED  You may occasionally and briefly wet your wound in the shower or bath. Do not soak or scrub your wound, do not swim, and avoid periods of heavy perspiration until the DERMABOND adhesive has naturally fallen off. After showering or bathing, gently blot your wound dry with a soft towel. If a protective dressing is being used, apply a fresh, dry bandage, being sure to keep the tape off the DERMABOND adhesive film.  Apply a clean, dry bandage over the wound if necessary to protect it.  Protect your wound from injury until the skin has had sufficient time to heal. 
 Do not scratch, rub, or pick at the DERMABOND adhesive film. This may loosen the film before your wound is healed.  Protect the wound from prolonged exposure to sunlight or tanning lamps while the film is in place. Other instructions · Use a doughnut cushion if sitting is uncomfortable. · Keep the area around your wound free from hair. Ask your doctor what method of hair removal will work best. 
Follow-up care is a key part of your treatment and safety. Be sure to make and go to all appointments, and call your doctor if you are having problems.  Itâs also a good idea to know your test results and keep a list of the medicines you take. When should you call for help? Call 911 anytime you think you may need emergency care. For example, call if: 
· You passed out (lost consciousness). · You have sudden chest pain and shortness of breath, or you cough up blood. · You have severe belly pain. Call your doctor now or seek immediate medical care if: 
· You have pain that does not get better after you take your pain medicine. · Your incision was closed with stitches and the stitches come loose, or your incision comes open. · Bright red blood has soaked through the bandage over your incision. · You have signs of infection, such as: 
¨ Increased pain, swelling, warmth, or redness. ¨ Red streaks leading from the incision. ¨ Pus draining from the incision. ¨ Swollen lymph nodes in your neck, armpits, or groin. A fever. DO NOT TAKE TYLENOL/ACETAMINOPHEN WITH PERCOCET, [facility], [address] TAKE NARCOTIC PAIN MEDICATIONS WITH FOOD Narcotics tend to be constipating, we suggest taking a stool softener such as Colace or Miralax (follow package instructions). DO NOT DRIVE WHILE TAKING NARCOTIC PAIN MEDICATIONS. DO NOT TAKE SLEEPING MEDICATIONS OR ANTIANXIETY MEDICATIONS WHILE TAKING NARCOTIC PAIN MEDICATIONS,  ESPECIALLY THE NIGHT OF ANESTHESIA. CPAP PATIENTS BE SURE TO WEAR MACHINE WHENEVER NAPPING OR SLEEPING. DISCHARGE SUMMARY from Nurse The following personal items collected during your admission are returned to you:  
Dental Appliance: Dental Appliances: None Vision: Visual Aid: None Hearing Aid:   
Jewelry: Jewelry: None Clothing: Clothing: Other (comment) (belonging bag) Other Valuables: Other Valuables: None Valuables sent to safe:   
 
 
PATIENT INSTRUCTIONS: 
 
 
B - Balance E - Eyes F-  Face looks uneven A-  Arms unable to move or move even S-  Speech slurred or non-existent T-  Time-call 911 as soon as signs and symptoms begin-DO NOT go Back to bed or wait to see if you get better-TIME IS BRAIN. If you have not received your influenza and/or pneumococcal vaccine, please follow up with your primary care physician. The discharge information has been reviewed with the patient and caregiver. The patient and caregiver verbalized understanding. Discharge Instructions Attachments/References MEFS - OXYCODONE/ACETAMINOPHEN (PERCOCET, ROXICET) - (BY MOUTH) (ENGLISH) Discharge Orders None  
  
Introducing \[facility]\"" & HEALTH SERVICES! [facility] introduces Liftopia patient portal. Now you can access parts of your medical record, email your doctor's office, and request medication refills online. 1. In your internet browser, go to [email or web address] 2. Click on the First Time User? Click Here link in the Sign In box. You will see the New Member Sign Up page. 3. Enter your Liftopia Access Code exactly as it appears below. You will not need to use this code after youve completed the sign-up process. If you do not sign up before the expiration date, you must request a new code. · Liftopia Access Code: [email or web address] Expires: 11/23/2017 10:35 AM 
 
4. Enter the last four digits of your Social Security Number (xxxx) and Date of Birth (mm/dd/yyyy) as indicated and click Submit. You will be taken to the next sign-up page. 5. Create a Liftopia ID. This will be your Liftopia login ID and cannot be changed, so think of one that is secure and easy to remember. 6. Create a Liftopia password. You can change your password at any time. 7. Enter your Password Reset Question and Answer. This can be used at a later time if you forget your password. 8. Enter your e-mail address. You will receive e-mail notification when new information is available in [address] 9. Click Sign Up. You can now view and download portions of your medical record. 10. Click the Download Summary menu link to download a portable copy of your medical information. If you have questions, please visit the Frequently Asked Questions section of the Liftopia website. Remember, Liftopia is NOT to be used for urgent needs. For medical emergencies, dial 911. Now available from your iPhone and Android! General Information Please provide this summary of care documentation to your next provider. Patient Signature:  ____________________________________________________________ Date:  ____________________________________________________________  
  
Sina Coup Provider Signature:  ____________________________________________________________ Date:  ____________________________________________________________ More Information Oxycodone/Acetaminophen (Percocet, Roxicet) - (By mouth) Why this medicine is used:  
Treats pain. This medicine contains a narcotic pain reliever. Contact a nurse or doctor right away if you have: 
· Extreme weakness, shallow breathing, slow heartbeat · Sweating or cold, clammy skin · Skin blisters, rash, or peeling Common side effects: 
· Constipation · Nausea, vomiting · Tiredness © 2017 [facility] Information is for End User's use only and may not be sold, redistributed or otherwise used for commercial purposes.

## 2017-10-09 ENCOUNTER — OFFICE VISIT (OUTPATIENT)
Dept: SURGERY | Age: 27
End: 2017-10-09

## 2017-10-09 VITALS
RESPIRATION RATE: 16 BRPM | OXYGEN SATURATION: 98 % | DIASTOLIC BLOOD PRESSURE: 76 MMHG | WEIGHT: 131 LBS | HEIGHT: 67 IN | SYSTOLIC BLOOD PRESSURE: 122 MMHG | BODY MASS INDEX: 20.56 KG/M2 | HEART RATE: 72 BPM | TEMPERATURE: 96.2 F

## 2017-10-09 DIAGNOSIS — Z09 POSTOPERATIVE EXAMINATION: Primary | ICD-10-CM

## 2017-10-09 RX ORDER — SULFAMETHOXAZOLE AND TRIMETHOPRIM 800; 160 MG/1; MG/1
1 TABLET ORAL 2 TIMES DAILY
COMMUNITY
End: 2017-10-09

## 2017-10-09 NOTE — PROGRESS NOTES
Melody Gracia is a 33 y.o. male  Chief Complaint   Patient presents with    Surgical Follow-up     9/25/17 excision or complex pilonidal cyst w/ closure w/ R to L advancement flap     1. Have you been to an emergency room, urgent clinic, or hospitalized since your last visit? NO  If yes, where when, and reason for visit? 2. Have seen or consulted any other health care provider since your last visit? NO  Please include any pap smears or colon screening in this section  If yes, where when, and reason for visit? 6. Do you have an Advanced Directive/ Living Will in place?  NO  If yes, do we have a copy on file NO  If no, would you like information NO

## 2017-10-09 NOTE — MR AVS SNAPSHOT
Visit Information Date & Time Provider Department Dept. Phone Encounter #  
 10/9/2017  2:00 PM Fiordaliza Donovan MD [facility] [id number] Upcoming Health Maintenance Date Due DTaP/Tdap/Td series (1 - Tdap) 7/25/2012 INFLUENZA AGE 9 TO ADULT 8/1/2017 Allergies as of 10/9/2017  Review Complete On: 10/9/2017 By: Fiordaliza Donovan MD  
 No Known Allergies Current Immunizations  Never Reviewed No immunizations on file. Not reviewed this visit You Were Diagnosed With   
  
 Codes Comments Postoperative examination    -  Primary ICD-10-CM: K54 ICD-9-CM: V67.00 Vitals BP Pulse Temp Resp Height(growth percentile) Weight(growth percentile) 122/76 (BP 1 Location: Right arm, BP Patient Position: Sitting) 72 96.2 °F (35.7 °C) (Oral) 16 5' 7\" (1.702 m) 131 lb (59.4 kg) SpO2 BMI Smoking Status 98% 20.52 kg/m2 Never Smoker BMI and BSA Data Body Mass Index Body Surface Area 20.52 kg/m 2 1.68 m 2 Preferred Pharmacy Pharmacy Name Phone [facility] [address] [place] - [address] [address] [phone] Your Updated Medication List  
  
Notice  As of 10/9/2017  2:35 PM  
 You have not been prescribed any medications. Introducing [facility] & HEALTH SERVICES! Phil Talbert introduces [employer] On[employer] patient portal. Now you can access parts of your medical record, email your doctor's office, and request medication refills online. 1. In your internet browser, go to [email or web address]. [employer] 2. Click on the First Time User? Click Here link in the Sign In box. You will see the New Member Sign Up page. 3. Enter your [employer] Access Code exactly as it appears below. You will not need to use this code after youve completed the sign-up process. If you do not sign up before the expiration date, you must request a new code. · [employer] Access Code: [email or web address] Expires: 11/23/2017 10:35 AM 
 
4. Enter the last four digits of your Social Security Number (xxxx) and Date of Birth (mm/dd/yyyy) as indicated and click Submit. You will be taken to the next sign-up page. 5. Create a Metaconomy ID. This will be your Metaconomy login ID and cannot be changed, so think of one that is secure and easy to remember. 6. Create a Metaconomy password. You can change your password at any time. 7. Enter your Password Reset Question and Answer. This can be used at a later time if you forget your password. 8. Enter your e-mail address. You will receive e-mail notification when new information is available in [address] 9. Click Sign Up. You can now view and download portions of your medical record. 10. Click the Download Summary menu link to download a portable copy of your medical information. If you have questions, please visit the Frequently Asked Questions section of the Metaconomy website. Remember, Metaconomy is NOT to be used for urgent needs. For medical emergencies, dial 911. Now available from your iPhone and Android! Please provide this summary of care documentation to your next provider. Your primary care clinician is listed as Uriel Campbell. If you have any questions after today's visit, please call [phone].

## 2017-10-09 NOTE — PROGRESS NOTES
Chief Complaint   Patient presents with    Surgical Follow-up     9/25/17 excision or complex pilonidal cyst w/ closure w/ R to L advancement flap       Tolerating PO  Pain controlled  Not compliant with the Bactrim, still has some left, skipping doses. 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 Wound: clean, dry, no drainage    Doing well  Reviewed wound care    Follow-up: 2 weeks.     Marisela Otoole MD FACS

## 2017-10-26 ENCOUNTER — OFFICE VISIT (OUTPATIENT)
Dept: SURGERY | Age: 27
End: 2017-10-26

## 2017-10-26 VITALS
SYSTOLIC BLOOD PRESSURE: 122 MMHG | WEIGHT: 131.5 LBS | BODY MASS INDEX: 20.64 KG/M2 | OXYGEN SATURATION: 98 % | HEIGHT: 67 IN | RESPIRATION RATE: 20 BRPM | DIASTOLIC BLOOD PRESSURE: 69 MMHG | HEART RATE: 60 BPM | TEMPERATURE: 96.1 F

## 2017-10-26 DIAGNOSIS — Z09 POSTOPERATIVE EXAMINATION: Primary | ICD-10-CM

## 2017-10-26 NOTE — PROGRESS NOTES
Chief Complaint   Patient presents with    Follow-up     2 week Fu. S/P excision of complex pilonidal cyst on 9/25/17. Last seen in the office 10/9/17. No complaints      Physical Exam:   Most of wound is healed. 1 cm opening inferiorly. Doing well  Time to trim hairs again  Neosporin daily on this small opening.   Follow-up: in 2 week(s)      Catalina Chapman MD FACS

## 2017-10-26 NOTE — MR AVS SNAPSHOT
Visit Information Date & Time Provider Department Dept. Phone Encounter #  
 10/26/2017  1:30 PM Fiordaliza Donovan MD [facility] [id number] Upcoming Health Maintenance Date Due DTaP/Tdap/Td series (1 - Tdap) 7/25/2012 INFLUENZA AGE 9 TO ADULT 8/1/2017 Allergies as of 10/26/2017  Review Complete On: 10/26/2017 By: Fiordaliza Donovan MD  
 No Known Allergies Current Immunizations  Never Reviewed No immunizations on file. Not reviewed this visit You Were Diagnosed With   
  
 Codes Comments Postoperative examination    -  Primary ICD-10-CM: G66 ICD-9-CM: V67.00 Vitals BP Pulse Temp Resp Height(growth percentile) Weight(growth percentile) 122/69 (BP 1 Location: Left arm, BP Patient Position: Sitting) 60 96.1 °F (35.6 °C) (Oral) 20 5' 7\" (1.702 m) 131 lb 8 oz (59.6 kg) SpO2 BMI Smoking Status 98% 20.6 kg/m2 Never Smoker BMI and BSA Data Body Mass Index Body Surface Area  
 20.6 kg/m 2 1.68 m 2 Preferred Pharmacy Pharmacy Name Phone [facility] [address] [place] [facility] - [address] [address] [phone] Your Updated Medication List  
  
Notice  As of 10/26/2017  1:52 PM  
 You have not been prescribed any medications. Introducing [facility] & HEALTH SERVICES! Phil Talbert introduces moneymeets patient portal. Now you can access parts of your medical record, email your doctor's office, and request medication refills online. 1. In your internet browser, go to [email or web address] 2. Click on the First Time User? Click Here link in the Sign In box. You will see the New Member Sign Up page. 3. Enter your moneymeets Access Code exactly as it appears below. You will not need to use this code after youve completed the sign-up process. If you do not sign up before the expiration date, you must request a new code. · moneymeets Access Code: [email or web address] Expires: 11/23/2017 10:35 AM 
 
4. Enter the last four digits of your Social Security Number (xxxx) and Date of Birth (mm/dd/yyyy) as indicated and click Submit. You will be taken to the next sign-up page. 5. Create a Velocomp ID. This will be your Velocomp login ID and cannot be changed, so think of one that is secure and easy to remember. 6. Create a Velocomp password. You can change your password at any time. 7. Enter your Password Reset Question and Answer. This can be used at a later time if you forget your password. 8. Enter your e-mail address. You will receive e-mail notification when new information is available in [address] 9. Click Sign Up. You can now view and download portions of your medical record. 10. Click the Download Summary menu link to download a portable copy of your medical information. If you have questions, please visit the Frequently Asked Questions section of the Velocomp website. Remember, Velocomp is NOT to be used for urgent needs. For medical emergencies, dial 911. Now available from your iPhone and Android! Please provide this summary of care documentation to your next provider. Your primary care clinician is listed as Mk Braga. If you have any questions after today's visit, please call [phone].

## 2017-11-10 ENCOUNTER — OFFICE VISIT (OUTPATIENT)
Dept: SURGERY | Age: 27
End: 2017-11-10

## 2017-11-10 VITALS
RESPIRATION RATE: 18 BRPM | TEMPERATURE: 98 F | BODY MASS INDEX: 21.03 KG/M2 | SYSTOLIC BLOOD PRESSURE: 122 MMHG | DIASTOLIC BLOOD PRESSURE: 78 MMHG | HEART RATE: 69 BPM | OXYGEN SATURATION: 97 % | HEIGHT: 67 IN | WEIGHT: 134 LBS

## 2017-11-10 DIAGNOSIS — Z09 POSTOPERATIVE EXAMINATION: Primary | ICD-10-CM

## 2017-11-10 NOTE — MR AVS SNAPSHOT
Visit Information Date & Time Provider Department Dept. Phone Encounter #  
 11/10/2017 10:00 AM Makenna Hadadd MD [facility] [id number] Upcoming Health Maintenance Date Due DTaP/Tdap/Td series (1 - Tdap) 7/25/2012 Influenza Age 5 to Adult 8/1/2017 Allergies as of 11/10/2017  Review Complete On: 11/10/2017 By: Makenna Haddad MD  
 No Known Allergies Current Immunizations  Never Reviewed No immunizations on file. Not reviewed this visit You Were Diagnosed With   
  
 Codes Comments Postoperative examination    -  Primary ICD-10-CM: V50 ICD-9-CM: V67.00 Vitals BP Pulse Temp Resp Height(growth percentile) Weight(growth percentile) 122/78 69 98 °F (36.7 °C) (Oral) 18 5' 7\" (1.702 m) 134 lb (60.8 kg) SpO2 BMI Smoking Status 97% 20.99 kg/m2 Never Smoker BMI and BSA Data Body Mass Index Body Surface Area  
 20.99 kg/m 2 1.7 m 2 Preferred Pharmacy Pharmacy Name Phone [facility] [address] [place] [facility] [address] [facility], [place] [address] [phone] Your Updated Medication List  
  
Notice  As of 11/10/2017 11:46 AM  
 You have not been prescribed any medications. Introducing [facility] & HEALTH SERVICES! [employer] introduces FireScope patient portal. Now you can access parts of your medical record, email your doctor's office, and request medication refills online. 1. In your internet browser, go to [email or web address] 2. Click on the First Time User? Click Here link in the Sign In box. You will see the New Member Sign Up page. 3. Enter your FireScope Access Code exactly as it appears below. You will not need to use this code after youve completed the sign-up process. If you do not sign up before the expiration date, you must request a new code. · FireScope Access Code: [email or web address] Expires: 11/23/2017  9:35 AM 
 
 4. Enter the last four digits of your Social Security Number (xxxx) and Date of Birth (mm/dd/yyyy) as indicated and click Submit. You will be taken to the next sign-up page. 5. Create a HipLink ID. This will be your HipLink login ID and cannot be changed, so think of one that is secure and easy to remember. 6. Create a HipLink password. You can change your password at any time. 7. Enter your Password Reset Question and Answer. This can be used at a later time if you forget your password. 8. Enter your e-mail address. You will receive e-mail notification when new information is available in [address] 9. Click Sign Up. You can now view and download portions of your medical record. 10. Click the Download Summary menu link to download a portable copy of your medical information. If you have questions, please visit the Frequently Asked Questions section of the HipLink website. Remember, HipLink is NOT to be used for urgent needs. For medical emergencies, dial 911. Now available from your iPhone and Android! Please provide this summary of care documentation to your next provider. Your primary care clinician is listed as Homa Walsh. If you have any questions after today's visit, please call [phone].

## 2017-11-10 NOTE — PROGRESS NOTES
The majority of the wound is healed  The last CM inferiorly is open with some hairs in it  The was cleaned and packed with 1/4\" iodoform    He was asked to apply neosporin to the wound last time, but has not done so. Will start now    F/u 1 week to assess.

## 2017-11-10 NOTE — PATIENT INSTRUCTIONS
Remove packing in 2 days  Apply neosporin daily  Was daily and keep covered with dry gauze  Change gauze twice a day

## 2017-11-20 ENCOUNTER — OFFICE VISIT (OUTPATIENT)
Dept: SURGERY | Age: 27
End: 2017-11-20

## 2017-11-20 VITALS
TEMPERATURE: 96.8 F | SYSTOLIC BLOOD PRESSURE: 120 MMHG | HEIGHT: 67 IN | HEART RATE: 77 BPM | WEIGHT: 132 LBS | RESPIRATION RATE: 20 BRPM | OXYGEN SATURATION: 96 % | DIASTOLIC BLOOD PRESSURE: 77 MMHG | BODY MASS INDEX: 20.72 KG/M2

## 2017-11-20 DIAGNOSIS — Z09 POSTOPERATIVE EXAMINATION: Primary | ICD-10-CM

## 2017-11-20 NOTE — PROGRESS NOTES
The majority of the wound is healed  The last CM inferiorly is open with some hairs in it  It was cleaned and was touched with silver nitrate to rid of granulation tissue.     Cont current wound instructions.     F/u 2 week to assess.         Instructions   Keep hairs trimmed  Apply neosporin daily  Was daily and keep covered with dry gauze  Change gauze twice a day

## 2017-11-20 NOTE — PATIENT INSTRUCTIONS
Keep hairs trimmed  Apply neosporin daily  Was daily and keep covered with dry gauze  Change gauze twice a day

## 2017-11-20 NOTE — MR AVS SNAPSHOT
Visit Information Date & Time Provider Department Dept. Phone Encounter #  
 11/20/2017  1:00 PM Mary Buenrostro MD [facility] [id number] Upcoming Health Maintenance Date Due DTaP/Tdap/Td series (1 - Tdap) 7/25/2012 Influenza Age 5 to Adult 8/1/2017 Allergies as of 11/20/2017  Review Complete On: 11/20/2017 By: Sangeeta Hartman LPN No Known Allergies Current Immunizations  Never Reviewed No immunizations on file. Not reviewed this visit You Were Diagnosed With   
  
 Codes Comments Postoperative examination    -  Primary ICD-10-CM: V68 ICD-9-CM: V67.00 Vitals BP Pulse Temp Resp Height(growth percentile) Weight(growth percentile) 120/77 (BP 1 Location: Left arm, BP Patient Position: Sitting) 77 96.8 °F (36 °C) (Oral) 20 5' 7\" (1.702 m) 132 lb (59.9 kg) SpO2 BMI Smoking Status 96% 20.67 kg/m2 Never Smoker BMI and BSA Data Body Mass Index Body Surface Area  
 20.67 kg/m 2 1.68 m 2 Preferred Pharmacy Pharmacy Name Phone [facility] [address] [place] [employer] - [address] [facility], [place] [address] [phone] Your Updated Medication List  
  
Notice  As of 11/20/2017  2:15 PM  
 You have not been prescribed any medications. Patient Instructions Keep hairs trimmed Apply neosporin daily Was daily and keep covered with dry gauze Change gauze twice a day Introducing [facility]! Basil Burger introduces ZenHub patient portal. Now you can access parts of your medical record, email your doctor's office, and request medication refills online. 1. In your internet browser, go to [email or web address] 2. Click on the First Time User? Click Here link in the Sign In box. You will see the New Member Sign Up page. 3. Enter your ZenHub Access Code exactly as it appears below.  You will not need to use this code after youve completed the sign-up process. If you do not sign up before the expiration date, you must request a new code. · Calxeda Access Code: [email or web address] Expires: 11/23/2017  9:35 AM 
 
4. Enter the last four digits of your Social Security Number (xxxx) and Date of Birth (mm/dd/yyyy) as indicated and click Submit. You will be taken to the next sign-up page. 5. Create a Calxeda ID. This will be your Calxeda login ID and cannot be changed, so think of one that is secure and easy to remember. 6. Create a Calxeda password. You can change your password at any time. 7. Enter your Password Reset Question and Answer. This can be used at a later time if you forget your password. 8. Enter your e-mail address. You will receive e-mail notification when new information is available in [address] 9. Click Sign Up. You can now view and download portions of your medical record. 10. Click the Download Summary menu link to download a portable copy of your medical information. If you have questions, please visit the Frequently Asked Questions section of the Calxeda website. Remember, Calxeda is NOT to be used for urgent needs. For medical emergencies, dial 911. Now available from your iPhone and Android! Please provide this summary of care documentation to your next provider. Your primary care clinician is listed as Luís Chance. If you have any questions after today's visit, please call [phone].

## 2017-12-07 ENCOUNTER — OFFICE VISIT (OUTPATIENT)
Dept: SURGERY | Age: 27
End: 2017-12-07

## 2017-12-07 VITALS
OXYGEN SATURATION: 97 % | RESPIRATION RATE: 16 BRPM | TEMPERATURE: 95.9 F | BODY MASS INDEX: 20.72 KG/M2 | DIASTOLIC BLOOD PRESSURE: 75 MMHG | HEART RATE: 69 BPM | HEIGHT: 67 IN | SYSTOLIC BLOOD PRESSURE: 109 MMHG | WEIGHT: 132 LBS

## 2017-12-07 DIAGNOSIS — Z09 POSTOPERATIVE EXAMINATION: Primary | ICD-10-CM

## 2017-12-07 NOTE — MR AVS SNAPSHOT
Visit Information Date & Time Provider Department Dept. Phone Encounter #  
 12/7/2017 11:00 AM Haydee Rodriguez MD [facility] [id number] Upcoming Health Maintenance Date Due DTaP/Tdap/Td series (1 - Tdap) 7/25/2012 Influenza Age 5 to Adult 8/1/2017 Allergies as of 12/7/2017  Review Complete On: 12/7/2017 By: Haydee Rodriguez MD  
 No Known Allergies Current Immunizations  Never Reviewed No immunizations on file. Not reviewed this visit You Were Diagnosed With   
  
 Codes Comments Postoperative examination    -  Primary ICD-10-CM: H05 ICD-9-CM: V67.00 Vitals BP Pulse Temp Resp Height(growth percentile) Weight(growth percentile) 109/75 (BP 1 Location: Right arm, BP Patient Position: Sitting) 69 95.9 °F (35.5 °C) (Oral) 16 5' 7\" (1.702 m) 132 lb (59.9 kg) SpO2 BMI Smoking Status 97% 20.67 kg/m2 Never Smoker BMI and BSA Data Body Mass Index Body Surface Area  
 20.67 kg/m 2 1.68 m 2 Preferred Pharmacy Pharmacy Name Phone [facility] [address] [place] [facility] - [address] [facility], [place] [address] [phone] Your Updated Medication List  
  
Notice  As of 12/7/2017 12:46 PM  
 You have not been prescribed any medications. Introducing [facility] & [facility] SERVICES! Martha Tyler introduces efish [employer] patient portal. Now you can access parts of your medical record, email your doctor's office, and request medication refills online. 1. In your internet browser, go to [email or web address] 2. Click on the First Time User? Click Here link in the Sign In box. You will see the New Member Sign Up page. 3. Enter your efish [place] Access Code exactly as it appears below. You will not need to use this code after youve completed the sign-up process. If you do not sign up before the expiration date, you must request a new code.  
 
· efish [place] Access Code: [email or web address] 
 Expires: 3/7/2018 10:40 AM 
 
4. Enter the last four digits of your Social Security Number (xxxx) and Date of Birth (mm/dd/yyyy) as indicated and click Submit. You will be taken to the next sign-up page. 5. Create a Safecare ID. This will be your Safecare login ID and cannot be changed, so think of one that is secure and easy to remember. 6. Create a Safecare password. You can change your password at any time. 7. Enter your Password Reset Question and Answer. This can be used at a later time if you forget your password. 8. Enter your e-mail address. You will receive e-mail notification when new information is available in [address] 9. Click Sign Up. You can now view and download portions of your medical record. 10. Click the Download Summary menu link to download a portable copy of your medical information. If you have questions, please visit the Frequently Asked Questions section of the Safecare website. Remember, Safecare is NOT to be used for urgent needs. For medical emergencies, dial 911. Now available from your iPhone and Android! Please provide this summary of care documentation to your next provider. Your primary care clinician is listed as Sammy Vanegas. If you have any questions after today's visit, please call [phone].

## 2018-01-25 ENCOUNTER — OFFICE VISIT (OUTPATIENT)
Dept: SURGERY | Age: 28
End: 2018-01-25

## 2018-01-25 VITALS
TEMPERATURE: 98.1 F | SYSTOLIC BLOOD PRESSURE: 101 MMHG | WEIGHT: 134 LBS | HEIGHT: 66 IN | RESPIRATION RATE: 12 BRPM | HEART RATE: 62 BPM | OXYGEN SATURATION: 98 % | DIASTOLIC BLOOD PRESSURE: 59 MMHG | BODY MASS INDEX: 21.53 KG/M2

## 2018-01-25 DIAGNOSIS — Z09 POSTOPERATIVE EXAMINATION: Primary | ICD-10-CM

## 2018-01-25 NOTE — PROGRESS NOTES
Pt last seen 7 weeks ago    Incision is almost completely healed  There is a 1 mm opening at the lower apex, 2 hairs removed and touced with silver nitrate    Needs to squat on a mirror and keep the hairs trimmed    Should heal just fine    F/u PRN

## 2018-01-25 NOTE — PROGRESS NOTES
Pedro Luis Tyler is a 33 y.o. male      Chief Complaint   Patient presents with    Follow-up     S/P Excision of complex pilonidal Cyst w/h advancement flap closure on 9/25/17       1. Have you been to the ER, urgent care clinic since your last visit? Hospitalized since your last visit? No    2. Have you seen or consulted any other health care providers outside of the [address] [place] [address] since your last visit? Include any pap smears or colon screening.  No

## 2018-01-25 NOTE — MR AVS SNAPSHOT
[address], [address] 
[phone] Patient: Jarocho Sam MRN: [id number] TDV:0[birth year]25 Visit Information Date & Time Provider Department Dept. Phone Encounter #  
 1/25/2018  1:30 PM Jayda Stewart MD [facility] [id number] Upcoming Health Maintenance Date Due DTaP/Tdap/Td series (1 - Tdap) 7/25/2012 Influenza Age 5 to Adult 8/1/2017 Allergies as of 1/25/2018  Review Complete On: 1/25/2018 By: Jayda Stewart MD  
 No Known Allergies Current Immunizations  Never Reviewed No immunizations on file. Not reviewed this visit You Were Diagnosed With   
  
 Codes Comments Postoperative examination    -  Primary ICD-10-CM: F85 ICD-9-CM: V67.00 Vitals BP Pulse Temp Resp Height(growth percentile) Weight(growth percentile) 101/59 (BP 1 Location: Right arm, BP Patient Position: Sitting) 62 98.1 °F (36.7 °C) (Oral) 12 5' 6\" (1.676 m) 134 lb (60.8 kg) SpO2 BMI Smoking Status 98% 21.63 kg/m2 Never Smoker Vitals History BMI and BSA Data Body Mass Index Body Surface Area  
 21.63 kg/m 2 1.68 m 2 Preferred Pharmacy Pharmacy Name Phone [facility] [address] [place] [facility] - [address] [facility], [place] [address] [phone] Your Updated Medication List  
  
Notice  As of 1/25/2018  2:20 PM  
 You have not been prescribed any medications. Introducing [facility] & HEALTH SERVICES! Charmaine Michel introduces ChartsNow (now MusicQubed) patient portal. Now you can access parts of your medical record, email your doctor's office, and request medication refills online. 1. In your internet browser, go to [email or web address] 2. Click on the First Time User? Click Here link in the Sign In box. You will see the New Member Sign Up page. 3. Enter your ChartsNow (now MusicQubed) Access Code exactly as it appears below.  You will not need to use this code after youve completed the sign-up process. If you do not sign up before the expiration date, you must request a new code. · StuffBuff Access Code: [email or web address] Expires: 3/7/2018 10:40 AM 
 
4. Enter the last four digits of your Social Security Number (xxxx) and Date of Birth (mm/dd/yyyy) as indicated and click Submit. You will be taken to the next sign-up page. 5. Create a StuffBuff ID. This will be your StuffBuff login ID and cannot be changed, so think of one that is secure and easy to remember. 6. Create a StuffBuff password. You can change your password at any time. 7. Enter your Password Reset Question and Answer. This can be used at a later time if you forget your password. 8. Enter your e-mail address. You will receive e-mail notification when new information is available in [address] 9. Click Sign Up. You can now view and download portions of your medical record. 10. Click the Download Summary menu link to download a portable copy of your medical information. If you have questions, please visit the Frequently Asked Questions section of the StuffBuff website. Remember, StuffBuff is NOT to be used for urgent needs. For medical emergencies, dial 911. Now available from your iPhone and Android! Please provide this summary of care documentation to your next provider. Your primary care clinician is listed as Babita Spangler. If you have any questions after today's visit, please call [phone].

## 2021-05-07 NOTE — PROGRESS NOTES
Chief Complaint   Patient presents with    Follow-up     pilonidal cyst po 2 week follow up     1. Have you been to the ER, urgent care clinic since your last visit? Hospitalized since your last visit? No    2. Have you seen or consulted any other health care providers outside of the [address] [place] [address] since your last visit? Include any pap smears or colon screening.  No
The majority of the wound is healed  The last CM inferiorly is open with some hairs in it,     He is having difficulty trimming the hairs. I trimmed them for him today with clippers. Once this is healed he will need to crouch over a mirror and keep them trimmed to prevent reoccurrence.      It was cleaned and was touched with silver nitrate to rid of granulation tissue.      Cont current wound instructions.     F/u 2 week to re-assess.          Instructions   Keep hairs trimmed  Apply neosporin daily  Was daily and keep covered with dry gauze  Change gauze twice a day    
Detail Level: Zone

## 2024-08-23 ENCOUNTER — OFFICE VISIT (OUTPATIENT)
Age: 34
End: 2024-08-23

## 2024-08-23 VITALS
HEART RATE: 100 BPM | TEMPERATURE: 98.6 F | OXYGEN SATURATION: 98 % | DIASTOLIC BLOOD PRESSURE: 77 MMHG | SYSTOLIC BLOOD PRESSURE: 110 MMHG

## 2024-08-23 DIAGNOSIS — R50.81 FEVER IN OTHER DISEASES: ICD-10-CM

## 2024-08-23 DIAGNOSIS — U07.1 COVID-19: ICD-10-CM

## 2024-08-23 DIAGNOSIS — R52 BODY ACHES: Primary | ICD-10-CM

## 2024-08-23 LAB
Lab: ABNORMAL
PERFORMING INSTRUMENT: ABNORMAL
QC PASS/FAIL: ABNORMAL
SARS-COV-2, POC: DETECTED

## 2024-08-23 RX ORDER — IBUPROFEN 200 MG
400 TABLET ORAL ONCE
Status: COMPLETED | OUTPATIENT
Start: 2024-08-23 | End: 2024-08-23

## 2024-08-23 RX ADMIN — Medication 400 MG: at 10:15

## 2024-08-23 ASSESSMENT — PAIN DESCRIPTION - ORIENTATION: ORIENTATION: OTHER (COMMENT)

## 2024-08-23 NOTE — PROGRESS NOTES
Subjective: (As above and below)     The patient/guardian gave verbal consent to treat.        Chief Complaint   Patient presents with    Covid Testing     Cough, sore throat, body aches and fever. Highest at 103.2  X three days        Cameron Kim is a 34 y.o. male who presents for evaluation of : COVID exposure-symptoms started last night.  HPI      Review of Systems    Review of Systems - negative except as listed above    Reviewed PmHx, RxHx, FmHx, SocHx, AllgHx and updated in chart.  No family history on file.  No past medical history on file.   Social History     Socioeconomic History    Marital status: Single   Tobacco Use    Smokeless tobacco: Never   Substance and Sexual Activity    Alcohol use: Never          Current Outpatient Medications   Medication Sig    nirmatrelvir/ritonavir 300/100 (PAXLOVID) 20 x 150 MG & 10 x 100MG TBPK Take 3 tablets (two 150 mg nirmatrelvir and one 100 mg ritonavir tablets) by mouth every 12 hours for 5 days.     Current Facility-Administered Medications   Medication Dose Route Frequency    ibuprofen (ADVIL;MOTRIN) tablet 400 mg  400 mg Oral Once       Objective:     Vitals:    08/23/24 1005   BP: 110/77   Site: Left Upper Arm   Position: Sitting   Cuff Size: Medium Adult   Pulse: 100   Temp: 98.6 °F (37 °C)   SpO2: 98%       Physical Exam  Constitutional:       Appearance: Normal appearance.   HENT:      Nose: Congestion and rhinorrhea present.   Cardiovascular:      Rate and Rhythm: Normal rate and regular rhythm.      Pulses: Normal pulses.      Heart sounds: Normal heart sounds.   Pulmonary:      Effort: Pulmonary effort is normal.      Breath sounds: Normal breath sounds.   Neurological:      Mental Status: He is alert.            Assessment/ Plan:     1. Body aches  -     POCT COVID-19, Antigen  2. COVID-19  -     nirmatrelvir/ritonavir 300/100 (PAXLOVID) 20 x 150 MG & 10 x 100MG TBPK; Take 3 tablets (two 150 mg nirmatrelvir and one 100 mg ritonavir tablets) by mouth every

## 2025-02-23 ENCOUNTER — OFFICE VISIT (OUTPATIENT)
Age: 35
End: 2025-02-23

## 2025-02-23 VITALS
HEART RATE: 69 BPM | OXYGEN SATURATION: 96 % | WEIGHT: 136 LBS | RESPIRATION RATE: 14 BRPM | TEMPERATURE: 97.6 F | DIASTOLIC BLOOD PRESSURE: 86 MMHG | SYSTOLIC BLOOD PRESSURE: 120 MMHG

## 2025-02-23 DIAGNOSIS — J45.21 MILD INTERMITTENT ASTHMA WITH EXACERBATION: Primary | ICD-10-CM

## 2025-02-23 RX ORDER — ALBUTEROL SULFATE 90 UG/1
2 INHALANT RESPIRATORY (INHALATION) EVERY 4 HOURS PRN
Qty: 18 G | Refills: 0 | Status: SHIPPED | OUTPATIENT
Start: 2025-02-23

## 2025-02-23 RX ORDER — PREDNISONE 20 MG/1
40 TABLET ORAL DAILY
Qty: 10 TABLET | Refills: 0 | Status: SHIPPED | OUTPATIENT
Start: 2025-02-23 | End: 2025-02-28

## 2025-02-23 RX ORDER — ALBUTEROL SULFATE 90 UG/1
2 INHALANT RESPIRATORY (INHALATION) EVERY 6 HOURS PRN
COMMUNITY
End: 2025-02-23 | Stop reason: SDUPTHER

## 2025-02-23 RX ORDER — IPRATROPIUM BROMIDE AND ALBUTEROL SULFATE 2.5; .5 MG/3ML; MG/3ML
1 SOLUTION RESPIRATORY (INHALATION) ONCE
Status: SHIPPED | OUTPATIENT
Start: 2025-02-23

## 2025-04-10 ENCOUNTER — OFFICE VISIT (OUTPATIENT)
Age: 35
End: 2025-04-10

## 2025-04-10 VITALS
WEIGHT: 147 LBS | TEMPERATURE: 97.6 F | OXYGEN SATURATION: 97 % | SYSTOLIC BLOOD PRESSURE: 121 MMHG | DIASTOLIC BLOOD PRESSURE: 82 MMHG | RESPIRATION RATE: 16 BRPM | HEART RATE: 60 BPM

## 2025-04-10 DIAGNOSIS — K04.7 DENTAL INFECTION: Primary | ICD-10-CM

## 2025-04-10 DIAGNOSIS — K08.89 PAIN, DENTAL: ICD-10-CM

## 2025-04-10 RX ORDER — LIDOCAINE HYDROCHLORIDE 20 MG/ML
15 SOLUTION OROPHARYNGEAL
Qty: 100 ML | Refills: 0 | Status: SHIPPED | OUTPATIENT
Start: 2025-04-10

## 2025-04-10 RX ORDER — CLINDAMYCIN HYDROCHLORIDE 300 MG/1
300 CAPSULE ORAL 3 TIMES DAILY
Qty: 21 CAPSULE | Refills: 0 | Status: SHIPPED | OUTPATIENT
Start: 2025-04-10 | End: 2025-04-17

## 2025-04-10 NOTE — PATIENT INSTRUCTIONS
Dental infection and dental pain     No signs of systemic illness.  There is no drainable abscess or areas of fluctuance.  There is no submandibular or sublingual fullness.  He is breathing fine without difficulty.    He was provided with information for the emergency dental care including the [facility] and [facility] [place] clinic, the [facility], and others    Clindamycin 300mg caps: 1 pill 3x/day x 7 days    Magic mouthwash    Mix in a 1:1:1 Ratio  Placed in an airtight container and put in the fridge.  Able stay good for up to 5 days in the fridge  Soak cotton balls in the solution and placed on area of pain every 3 hours as needed    Viscous Lidocaine  Maalox  Diphenhydramine 12.5 mg / 5 mL

## 2025-04-22 ENCOUNTER — OFFICE VISIT (OUTPATIENT)
Age: 35
End: 2025-04-22
Payer: COMMERCIAL

## 2025-04-22 VITALS
RESPIRATION RATE: 14 BRPM | SYSTOLIC BLOOD PRESSURE: 108 MMHG | TEMPERATURE: 97.3 F | DIASTOLIC BLOOD PRESSURE: 69 MMHG | HEART RATE: 54 BPM | OXYGEN SATURATION: 98 % | BODY MASS INDEX: 22.54 KG/M2 | WEIGHT: 143.6 LBS | HEIGHT: 67 IN

## 2025-04-22 DIAGNOSIS — J45.21 MILD INTERMITTENT ASTHMA WITH EXACERBATION: ICD-10-CM

## 2025-04-22 DIAGNOSIS — Z00.00 ROUTINE GENERAL MEDICAL EXAMINATION AT A HEALTH CARE FACILITY: Primary | ICD-10-CM

## 2025-04-22 DIAGNOSIS — Z13.220 SCREENING FOR HYPERLIPIDEMIA: ICD-10-CM

## 2025-04-22 DIAGNOSIS — J45.20 MILD INTERMITTENT ASTHMA WITHOUT COMPLICATION: ICD-10-CM

## 2025-04-22 LAB
ALBUMIN SERPL-MCNC: 3.8 G/DL (ref 3.5–5)
ALBUMIN/GLOB SERPL: 1.1 (ref 1.1–2.2)
ALP SERPL-CCNC: 86 U/L (ref 45–117)
ALT SERPL-CCNC: 30 U/L (ref 12–78)
ANION GAP SERPL CALC-SCNC: 4 MMOL/L (ref 2–12)
AST SERPL-CCNC: 20 U/L (ref 15–37)
BASOPHILS # BLD: 0.04 K/UL (ref 0–0.1)
BASOPHILS NFR BLD: 0.7 % (ref 0–1)
BILIRUB SERPL-MCNC: 0.3 MG/DL (ref 0.2–1)
BUN SERPL-MCNC: 10 MG/DL (ref 6–20)
BUN/CREAT SERPL: 10 (ref 12–20)
CALCIUM SERPL-MCNC: 9.5 MG/DL (ref 8.5–10.1)
CHLORIDE SERPL-SCNC: 107 MMOL/L (ref 97–108)
CHOLEST SERPL-MCNC: 169 MG/DL
CO2 SERPL-SCNC: 28 MMOL/L (ref 21–32)
CREAT SERPL-MCNC: 1.04 MG/DL (ref 0.7–1.3)
DIFFERENTIAL METHOD BLD: ABNORMAL
EOSINOPHIL # BLD: 0.07 K/UL (ref 0–0.4)
EOSINOPHIL NFR BLD: 1.2 % (ref 0–7)
ERYTHROCYTE [DISTWIDTH] IN BLOOD BY AUTOMATED COUNT: 11.9 % (ref 11.5–14.5)
GLOBULIN SER CALC-MCNC: 3.4 G/DL (ref 2–4)
GLUCOSE SERPL-MCNC: 84 MG/DL (ref 65–100)
HCT VFR BLD AUTO: 46.1 % (ref 36.6–50.3)
HDLC SERPL-MCNC: 51 MG/DL
HDLC SERPL: 3.3 (ref 0–5)
HGB BLD-MCNC: 15.4 G/DL (ref 12.1–17)
IMM GRANULOCYTES # BLD AUTO: 0.01 K/UL (ref 0–0.04)
IMM GRANULOCYTES NFR BLD AUTO: 0.2 % (ref 0–0.5)
LDLC SERPL CALC-MCNC: 82.8 MG/DL (ref 0–100)
LYMPHOCYTES # BLD: 2.94 K/UL (ref 0.8–3.5)
LYMPHOCYTES NFR BLD: 49.2 % (ref 12–49)
MCH RBC QN AUTO: 30 PG (ref 26–34)
MCHC RBC AUTO-ENTMCNC: 33.4 G/DL (ref 30–36.5)
MCV RBC AUTO: 89.7 FL (ref 80–99)
MONOCYTES # BLD: 0.36 K/UL (ref 0–1)
MONOCYTES NFR BLD: 6 % (ref 5–13)
NEUTS SEG # BLD: 2.56 K/UL (ref 1.8–8)
NEUTS SEG NFR BLD: 42.7 % (ref 32–75)
NRBC # BLD: 0 K/UL (ref 0–0.01)
NRBC BLD-RTO: 0 PER 100 WBC
PLATELET # BLD AUTO: 271 K/UL (ref 150–400)
PMV BLD AUTO: 9.4 FL (ref 8.9–12.9)
POTASSIUM SERPL-SCNC: 4.2 MMOL/L (ref 3.5–5.1)
PROT SERPL-MCNC: 7.2 G/DL (ref 6.4–8.2)
RBC # BLD AUTO: 5.14 M/UL (ref 4.1–5.7)
SODIUM SERPL-SCNC: 139 MMOL/L (ref 136–145)
TRIGL SERPL-MCNC: 176 MG/DL
VLDLC SERPL CALC-MCNC: 35.2 MG/DL
WBC # BLD AUTO: 6 K/UL (ref 4.1–11.1)

## 2025-04-22 PROCEDURE — 99385 PREV VISIT NEW AGE 18-39: CPT | Performed by: NURSE PRACTITIONER

## 2025-04-22 RX ORDER — ALBUTEROL SULFATE 90 UG/1
2 INHALANT RESPIRATORY (INHALATION) EVERY 4 HOURS PRN
Qty: 18 G | Refills: 0 | Status: SHIPPED | OUTPATIENT
Start: 2025-04-22

## 2025-04-22 SDOH — ECONOMIC STABILITY: FOOD INSECURITY: WITHIN THE PAST 12 MONTHS, THE FOOD YOU BOUGHT JUST DIDN'T LAST AND YOU DIDN'T HAVE MONEY TO GET MORE.: NEVER TRUE

## 2025-04-22 SDOH — ECONOMIC STABILITY: FOOD INSECURITY: WITHIN THE PAST 12 MONTHS, YOU WORRIED THAT YOUR FOOD WOULD RUN OUT BEFORE YOU GOT MONEY TO BUY MORE.: NEVER TRUE

## 2025-04-22 ASSESSMENT — PATIENT HEALTH QUESTIONNAIRE - PHQ9
SUM OF ALL RESPONSES TO PHQ QUESTIONS 1-9: 0
SUM OF ALL RESPONSES TO PHQ QUESTIONS 1-9: 0
1. LITTLE INTEREST OR PLEASURE IN DOING THINGS: NOT AT ALL
SUM OF ALL RESPONSES TO PHQ QUESTIONS 1-9: 0
2. FEELING DOWN, DEPRESSED OR HOPELESS: NOT AT ALL
SUM OF ALL RESPONSES TO PHQ QUESTIONS 1-9: 0

## 2025-04-22 ASSESSMENT — ENCOUNTER SYMPTOMS
COUGH: 0
SHORTNESS OF BREATH: 0
CONSTIPATION: 0
BLOOD IN STOOL: 0
ABDOMINAL PAIN: 0

## 2025-04-22 NOTE — PROGRESS NOTES
History of Present Illness  Cameron Kim is a 34 y.o. male here for an annual exam and to establish care.    Employment and Lifestyle  - Employed at [employer], working night shifts, possibly contributing to occasional incoherence.  - Limited physical activity outside of work, spends time cleaning and caring for pets.  - Diet high in meats, grains, dairy, low in vegetables.  - Occasional alcohol and marijuana use, no tobacco.    Asthma  - History of asthma, uses albuterol inhaler as needed, last used a few weeks ago at work.  - No recent asthma attacks.    Depression  - Diagnosed with depression at age 14, no current symptoms, no counseling or therapy sought.  - Symptoms manageable, no significant disruption to daily life, no medication prescribed.    Supplemental information: No primary care physician for extended period. Last blood work conducted some time ago.    SOCIAL HISTORY  Occasional alcohol and marijuana use. Employed at [employer], working night shifts.    FAMILY HISTORY  Grandfather passed from Alzheimer's disease. Potential family history of heart disease, Alzheimer's disease, and dementia.      Review of Systems   Constitutional:  Negative for activity change and appetite change.   HENT:  Negative for ear discharge and ear pain.    Eyes:  Negative for visual disturbance.   Respiratory:  Negative for cough and shortness of breath.    Cardiovascular:  Negative for chest pain, palpitations and leg swelling.   Gastrointestinal:  Negative for abdominal pain, blood in stool and constipation.   Genitourinary:  Negative for difficulty urinating.   Musculoskeletal:  Negative for arthralgias.   Neurological:  Negative for weakness.   Psychiatric/Behavioral:  Negative for dysphoric mood.    All other systems reviewed and are negative.          Past Medical History:   Diagnosis Date    Asthma      Past Surgical History:   Procedure Laterality Date    CYSTOURETHROSCOPY/STENT REMOVAL Left      History reviewed. No pertinent

## 2025-04-22 NOTE — PROGRESS NOTES
Chief Complaint   Patient presents with    New Patient         \"Have you been to the ER, urgent care clinic since your last visit?  Hospitalized since your last visit?\"    NO    “Have you seen or consulted any other health care providers outside of [facility] since your last visit?”    NO            Click Here for Release of Records Request           4/22/2025    10:31 AM   PHQ-9    Little interest or pleasure in doing things 0   Feeling down, depressed, or hopeless 0   PHQ-2 Score 0   PHQ-9 Total Score 0           Financial Resource Strain: Not on file      Food Insecurity: No Food Insecurity (4/22/2025)    Hunger Vital Sign     Worried About Running Out of Food in the Last Year: Never true     Ran Out of Food in the Last Year: Never true          Health Maintenance Due   Topic Date Due    Depression Screen  Never done    Varicella vaccine (1 of 2 - 13+ 2-dose series) Never done    HIV screen  Never done    Hepatitis C screen  Never done    Hepatitis B vaccine (1 of 3 - 19+ 3-dose series) Never done    DTaP/Tdap/Td vaccine (1 - Tdap) Never done    COVID-19 Vaccine (1 - 2024-25 season) Never done

## 2025-04-23 ENCOUNTER — RESULTS FOLLOW-UP (OUTPATIENT)
Age: 35
End: 2025-04-23

## 2025-04-23 NOTE — RESULT ENCOUNTER NOTE
Izabela Mr. Kim, I hope you are doing well, I have your labs:    - Cholesterol looks great.    - Electrolytes, kidney function, and liver tests are normal. Blood sugar is normal.    - Blood count is essentially normal, no concerns.    Take care

## 2025-06-22 ENCOUNTER — OFFICE VISIT (OUTPATIENT)
Age: 35
End: 2025-06-22

## 2025-06-22 VITALS
OXYGEN SATURATION: 94 % | HEART RATE: 60 BPM | SYSTOLIC BLOOD PRESSURE: 116 MMHG | WEIGHT: 141 LBS | BODY MASS INDEX: 22.39 KG/M2 | DIASTOLIC BLOOD PRESSURE: 77 MMHG | RESPIRATION RATE: 16 BRPM | TEMPERATURE: 98.6 F

## 2025-06-22 DIAGNOSIS — R06.2 WHEEZING: Primary | ICD-10-CM

## 2025-06-22 DIAGNOSIS — B96.89 ACUTE BACTERIAL SINUSITIS: ICD-10-CM

## 2025-06-22 DIAGNOSIS — J01.90 ACUTE BACTERIAL SINUSITIS: ICD-10-CM

## 2025-06-22 RX ORDER — AMOXICILLIN 875 MG/1
875 TABLET, COATED ORAL 2 TIMES DAILY
Qty: 10 TABLET | Refills: 0 | Status: SHIPPED | OUTPATIENT
Start: 2025-06-22 | End: 2025-06-27

## 2025-06-22 RX ORDER — ALBUTEROL SULFATE 0.83 MG/ML
2.5 SOLUTION RESPIRATORY (INHALATION) ONCE
Status: COMPLETED | OUTPATIENT
Start: 2025-06-22 | End: 2025-06-22

## 2025-06-22 RX ORDER — PREDNISONE 20 MG/1
40 TABLET ORAL DAILY
Qty: 10 TABLET | Refills: 0 | Status: SHIPPED | OUTPATIENT
Start: 2025-06-22 | End: 2025-06-27

## 2025-06-22 RX ADMIN — ALBUTEROL SULFATE 2.5 MG: 0.83 SOLUTION RESPIRATORY (INHALATION) at 14:15

## (undated) DEVICE — CONTINU-FLO SOLUTION SET, 2 INJECTION SITES, MALE LUER LOCK ADAPTER WITH RETRACTABLE COLLAR, LARGE BORE STOPCOCK WITH ROTATING MALE LUER LOCK EXTENSION SET, 2 INJECTION SITES, MALE LUER LOCK ADAPTER WITH RETRACTABLE COLLAR: Brand: INTERLINK/CONTINU-FLO

## (undated) DEVICE — MASTISOL ADHESIVE LIQ 2/3ML

## (undated) DEVICE — TIP SUCT BLU PLAS BLB W/O CTRL VENT YANK

## (undated) DEVICE — 3M™ TEGADERM™ TRANSPARENT FILM DRESSING FRAME STYLE, 1624W, 2-3/8 IN X 2-3/4 IN (6 CM X 7 CM), 100/CT 4CT/CASE: Brand: 3M™ TEGADERM™

## (undated) DEVICE — SUTURE VCRL SZ 2-0 L18IN ABSRB VLT L26MM SH 1/2 CIR J775D

## (undated) DEVICE — (D)PREP SKN CHLRAPRP APPL 26ML -- CONVERT TO ITEM 371833

## (undated) DEVICE — Device

## (undated) DEVICE — ABDOMINAL PAD: Brand: DERMACEA

## (undated) DEVICE — MEDI-VAC NON-CONDUCTIVE SUCTION TUBING: Brand: CARDINAL HEALTH

## (undated) DEVICE — SURGICAL PROCEDURE PACK BASIN MAJ SET CUST NO CAUT

## (undated) DEVICE — TOWEL SURG W17XL27IN STD BLU COT NONFENESTRATED PREWASHED

## (undated) DEVICE — 1200 GUARD II KIT W/5MM TUBE W/O VAC TUBE: Brand: GUARDIAN

## (undated) DEVICE — KENDALL DL ECG CABLE AND LEAD WIRE SYSTEM, 3-LEAD, SINGLE PATIENT USE: Brand: KENDALL

## (undated) DEVICE — SUT STRATA PGA 3-0 PS-2 30CM -- STRATAFIX PGA PCL

## (undated) DEVICE — DRAIN SURG W7MMXL20CM SIL FULL PERF HUBLESS FLAT RADPQ STRP

## (undated) DEVICE — SUTURE MCRYL SZ 4-0 L27IN ABSRB UD L19MM PS-2 1/2 CIR PRIM Y426H

## (undated) DEVICE — SOLUTION IV 1000ML 0.9% SOD CHL

## (undated) DEVICE — SUTURE VCRL SZ 2-0 L27IN ABSRB VLT L26MM SH 1/2 CIR J317H

## (undated) DEVICE — STERILE POLYISOPRENE POWDER-FREE SURGICAL GLOVES: Brand: PROTEXIS

## (undated) DEVICE — BASIC PACK: Brand: CONVERTORS

## (undated) DEVICE — DRAPE,LAPAROTOMY,PED,STERILE: Brand: MEDLINE

## (undated) DEVICE — SOLUTION LACTATED RINGERS INJECTION USP

## (undated) DEVICE — NEEDLE HYPO 22GA L1.5IN BLK S STL HUB POLYPR SHLD REG BVL

## (undated) DEVICE — HEX-LOCKING BLADE ELECTRODE: Brand: EDGE

## (undated) DEVICE — GAUZE SPONGES,12 PLY: Brand: CURITY

## (undated) DEVICE — REM POLYHESIVE ADULT PATIENT RETURN ELECTRODE: Brand: VALLEYLAB

## (undated) DEVICE — DRAPE,REIN 53X77,STERILE: Brand: MEDLINE

## (undated) DEVICE — INFECTION CONTROL KIT SYS

## (undated) DEVICE — LIGHT HANDLE: Brand: DEVON

## (undated) DEVICE — ROCKER SWITCH PENCIL BLADE ELECTRODE, HOLSTER: Brand: EDGE